# Patient Record
Sex: FEMALE | Race: WHITE | Employment: UNEMPLOYED | URBAN - METROPOLITAN AREA
[De-identification: names, ages, dates, MRNs, and addresses within clinical notes are randomized per-mention and may not be internally consistent; named-entity substitution may affect disease eponyms.]

---

## 2022-02-28 ENCOUNTER — TRANSCRIBE ORDER (OUTPATIENT)
Dept: OCCUPATIONAL MEDICINE | Age: 37
End: 2022-02-28

## 2022-02-28 ENCOUNTER — HOSPITAL ENCOUNTER (OUTPATIENT)
Dept: OCCUPATIONAL MEDICINE | Age: 37
Discharge: HOME | End: 2022-02-28

## 2022-02-28 DIAGNOSIS — T14.90XA INJURY: Primary | ICD-10-CM

## 2022-02-28 DIAGNOSIS — T14.90XA INJURY: ICD-10-CM

## 2022-09-02 ENCOUNTER — HOSPITAL ENCOUNTER (EMERGENCY)
Age: 37
Discharge: HOME | End: 2022-09-02

## 2022-09-02 VITALS
OXYGEN SATURATION: 93 % | TEMPERATURE: 98 F | RESPIRATION RATE: 18 BRPM | HEIGHT: 65 IN | WEIGHT: 270 LBS | BODY MASS INDEX: 44.98 KG/M2 | HEART RATE: 86 BPM | SYSTOLIC BLOOD PRESSURE: 135 MMHG | DIASTOLIC BLOOD PRESSURE: 91 MMHG

## 2022-09-02 DIAGNOSIS — H60.393 INFECTIVE OTITIS EXTERNA OF BOTH EARS: Primary | ICD-10-CM

## 2022-09-02 DIAGNOSIS — H66.003 ACUTE SUPPURATIVE OTITIS MEDIA OF BOTH EARS WITHOUT SPONTANEOUS RUPTURE OF TYMPANIC MEMBRANES, RECURRENCE NOT SPECIFIED: ICD-10-CM

## 2022-09-02 PROCEDURE — 99284 EMERGENCY DEPT VISIT MOD MDM: CPT

## 2022-09-02 PROCEDURE — 6360000002 HC RX W HCPCS

## 2022-09-02 PROCEDURE — 96372 THER/PROPH/DIAG INJ SC/IM: CPT

## 2022-09-02 PROCEDURE — 6370000000 HC RX 637 (ALT 250 FOR IP)

## 2022-09-02 RX ORDER — AMOXICILLIN AND CLAVULANATE POTASSIUM 875; 125 MG/1; MG/1
1 TABLET, FILM COATED ORAL 2 TIMES DAILY
Qty: 20 TABLET | Refills: 0 | Status: SHIPPED | OUTPATIENT
Start: 2022-09-02 | End: 2022-09-12

## 2022-09-02 RX ORDER — ONDANSETRON 4 MG/1
4 TABLET, ORALLY DISINTEGRATING ORAL
Status: COMPLETED | OUTPATIENT
Start: 2022-09-02 | End: 2022-09-02

## 2022-09-02 RX ORDER — HYDROMORPHONE HYDROCHLORIDE 1 MG/ML
1 INJECTION, SOLUTION INTRAMUSCULAR; INTRAVENOUS; SUBCUTANEOUS
Status: COMPLETED | OUTPATIENT
Start: 2022-09-02 | End: 2022-09-02

## 2022-09-02 RX ORDER — HYDROCODONE BITARTRATE AND ACETAMINOPHEN 7.5; 325 MG/1; MG/1
1 TABLET ORAL EVERY 6 HOURS PRN
Qty: 12 TABLET | Refills: 0 | Status: SHIPPED | OUTPATIENT
Start: 2022-09-02 | End: 2022-09-05

## 2022-09-02 RX ORDER — AMOXICILLIN AND CLAVULANATE POTASSIUM 875; 125 MG/1; MG/1
1 TABLET, FILM COATED ORAL
Status: COMPLETED | OUTPATIENT
Start: 2022-09-02 | End: 2022-09-02

## 2022-09-02 RX ORDER — CIPROFLOXACIN AND DEXAMETHASONE 3; 1 MG/ML; MG/ML
4 SUSPENSION/ DROPS AURICULAR (OTIC) 2 TIMES DAILY
Qty: 10 ML | Refills: 0 | Status: SHIPPED | OUTPATIENT
Start: 2022-09-02 | End: 2022-09-09

## 2022-09-02 RX ADMIN — ONDANSETRON 4 MG: 4 TABLET, ORALLY DISINTEGRATING ORAL at 07:25

## 2022-09-02 RX ADMIN — AMOXICILLIN AND CLAVULANATE POTASSIUM 1 TABLET: 875; 125 TABLET, FILM COATED ORAL at 07:29

## 2022-09-02 RX ADMIN — HYDROMORPHONE HYDROCHLORIDE 1 MG: 1 INJECTION, SOLUTION INTRAMUSCULAR; INTRAVENOUS; SUBCUTANEOUS at 07:24

## 2022-09-02 ASSESSMENT — PAIN SCALES - GENERAL
PAINLEVEL_OUTOF10: 9
PAINLEVEL_OUTOF10: 9

## 2022-09-02 ASSESSMENT — ENCOUNTER SYMPTOMS
RESPIRATORY NEGATIVE: 1
GASTROINTESTINAL NEGATIVE: 1
SHORTNESS OF BREATH: 0
EYES NEGATIVE: 1

## 2022-09-02 ASSESSMENT — PAIN DESCRIPTION - LOCATION: LOCATION: EAR

## 2022-09-02 ASSESSMENT — PAIN - FUNCTIONAL ASSESSMENT: PAIN_FUNCTIONAL_ASSESSMENT: 0-10

## 2022-09-02 NOTE — ED PROVIDER NOTES
Otalgia      60-year-old female complaining of bilateral ear pain and yellow drainage from the right ear. Is been going for several days Unbearable this morning when she woke up. The history is provided by the patient. Otalgia  This is a new problem. The current episode started more than 2 days ago. The problem occurs constantly. The problem has been rapidly worsening. Associated symptoms include headaches. Pertinent negatives include no chest pain and no shortness of breath. Nothing aggravates the symptoms. Review of Systems   Constitutional: Negative. Negative for activity change, appetite change, chills, fatigue and fever. HENT:  Positive for ear discharge and ear pain. Eyes: Negative. Respiratory: Negative. Negative for shortness of breath. Cardiovascular: Negative. Negative for chest pain. Gastrointestinal: Negative. Endocrine: Negative. Musculoskeletal: Negative. Neurological:  Positive for headaches. Hematological: Negative. Psychiatric/Behavioral: Negative. All other systems reviewed and are negative. All other systems reviewed and are negative. Past Medical History:   Diagnosis Date    Asthma     albulterol inhaler prn; last episode 3/2014    Chronic kidney disease     x 3    Migraines     at all times since age 16    Morbid obesity ([address]     BMI 44    Other ill-defined conditions(799.89)     migraines    Unspecified sleep apnea     no cpap    Vertigo     r/t migraines        Past Surgical History:   Procedure Laterality Date    HEENT      tonsil        Family History   Problem Relation Age of Onset    Cancer Maternal Grandmother     Cancer Paternal Grandfather     Cancer Paternal Grandmother     Hypertension Mother         Social Connections: Not on file        No Known Allergies     Vitals signs and nursing note reviewed.    Patient Vitals for the past 4 hrs:   Temp Pulse Resp BP SpO2   09/02/22 0702 -- -- -- -- 97 %   09/02/22 0701 -- -- -- (!) 139/104 --   09/02/22 0541 -- -- -- (!) 133/102 --   09/02/22 0539 98 °F (36.7 °C) 85 18 -- 98 %          Physical Exam  Vitals and nursing note reviewed. Constitutional:       Appearance: Normal appearance. She is not ill-appearing. HENT:      Head: Normocephalic and atraumatic. Right Ear: Tenderness present. Left Ear: Tenderness present. Tympanic membrane is erythematous and bulging. Ears:      Comments: Right TM is obscured secondary to canal swelling left TM is red with loss of landmarks. Nose: Nose normal.      Mouth/Throat:      Mouth: Mucous membranes are moist.   Eyes:      Extraocular Movements: Extraocular movements intact. Conjunctiva/sclera: Conjunctivae normal.      Pupils: Pupils are equal, round, and reactive to light. Cardiovascular:      Rate and Rhythm: Normal rate and regular rhythm. Pulses: Normal pulses. Heart sounds: Normal heart sounds. Pulmonary:      Effort: Pulmonary effort is normal. No respiratory distress. Breath sounds: Normal breath sounds. Abdominal:      General: Bowel sounds are normal. There is no distension. Palpations: Abdomen is soft. Musculoskeletal:         General: No swelling or signs of injury. Normal range of motion. Cervical back: Normal range of motion. No rigidity. Skin:     General: Skin is warm and dry. Capillary Refill: Capillary refill takes less than 2 seconds. Neurological:      General: No focal deficit present. Mental Status: She is alert and oriented to person, place, and time. Mental status is at baseline. Psychiatric:         Mood and Affect: Mood normal.         Behavior: Behavior normal.         Thought Content:  Thought content normal.         Judgment: Judgment normal.        Procedures    Labs Reviewed - No data to display     No orders to display              [place] Coma Scale  Eye Opening: Spontaneous  Best Verbal Response: Oriented  Best Motor Response: Obeys commands  Shayne Coma

## 2022-09-02 NOTE — ED NOTES
Report given to Shahana Taylor RN. Transferring patient care at this time.       Sylvia Hagen RN  09/02/22 3549

## 2022-09-02 NOTE — ED TRIAGE NOTES
Pt to ED ambulatory to triage without difficulty with c/o right ear pain. Pt states it began a couple days ago. Pt states yellow/green drainage from the ear as well. Pt denies any fever/chills. Pt states difficulty hearing out of the right ear as well. Pt states taking tylenol otc with no relief.

## 2022-09-02 NOTE — ED NOTES
I have reviewed discharge instructions with the patient. The patient verbalized understanding. Patient left ED via Discharge Method: ambulatory to Home with family. Opportunity for questions and clarification provided. Patient given 3 scripts. To continue your aftercare when you leave the hospital, you may receive an automated call from our care team to check in on how you are doing. This is a free service and part of our promise to provide the best care and service to meet your aftercare needs.  If you have questions, or wish to unsubscribe from this service please call [phone]. Thank you for Choosing our [facility] Emergency Department.         Karyn Lam RN  09/02/22 9957

## 2022-11-26 ENCOUNTER — HOSPITAL ENCOUNTER (EMERGENCY)
Age: 37
Discharge: HOME | End: 2022-11-26
Attending: EMERGENCY MEDICINE

## 2022-11-26 ENCOUNTER — APPOINTMENT (OUTPATIENT)
Dept: GENERAL RADIOLOGY | Age: 37
End: 2022-11-26

## 2022-11-26 VITALS
TEMPERATURE: 98 F | WEIGHT: 275 LBS | RESPIRATION RATE: 15 BRPM | SYSTOLIC BLOOD PRESSURE: 138 MMHG | HEIGHT: 65 IN | OXYGEN SATURATION: 97 % | HEART RATE: 87 BPM | BODY MASS INDEX: 45.82 KG/M2 | DIASTOLIC BLOOD PRESSURE: 94 MMHG

## 2022-11-26 DIAGNOSIS — S93.602A FOOT SPRAIN, LEFT, INITIAL ENCOUNTER: Primary | ICD-10-CM

## 2022-11-26 PROCEDURE — 73630 X-RAY EXAM OF FOOT: CPT

## 2022-11-26 PROCEDURE — 99283 EMERGENCY DEPT VISIT LOW MDM: CPT | Performed by: EMERGENCY MEDICINE

## 2022-11-26 RX ORDER — MELOXICAM 15 MG/1
15 TABLET ORAL DAILY
Qty: 7 TABLET | Refills: 1 | Status: SHIPPED | OUTPATIENT
Start: 2022-11-26 | End: 2022-12-03

## 2022-11-26 ASSESSMENT — PAIN SCALES - GENERAL: PAINLEVEL_OUTOF10: 7

## 2022-11-26 ASSESSMENT — PAIN DESCRIPTION - ORIENTATION: ORIENTATION: LEFT;ANTERIOR

## 2022-11-26 ASSESSMENT — PAIN DESCRIPTION - LOCATION: LOCATION: FOOT

## 2022-11-26 ASSESSMENT — PAIN - FUNCTIONAL ASSESSMENT: PAIN_FUNCTIONAL_ASSESSMENT: 0-10

## 2022-11-26 NOTE — Clinical Note
Neal Ibarra was seen and treated in our emergency department on 11/26/2022. She may return to work on 11/28/2022. If you have any questions or concerns, please don't hesitate to call.       Jie Palmer MD

## 2022-11-26 NOTE — ED NOTES
I have reviewed discharge instructions with the patient. The patient verbalized understanding. Patient left ED via Discharge Method: wheelchair to Home with mother  Opportunity for questions and clarification provided. No esign  Patient given 1 scripts. To continue your aftercare when you leave the hospital, you may receive an automated call from our care team to check in on how you are doing. This is a free service and part of our promise to provide the best care and service to meet your aftercare needs.  If you have questions, or wish to unsubscribe from this service please call [phone]. Thank you for Choosing our [facility] Emergency Department.       Anthnoy Pizarro RN  11/26/22 2970

## 2022-11-26 NOTE — DISCHARGE INSTRUCTIONS
Consider crutches for limited weightbearing for a few days. Consider using boot until rechecked by orthopedist call your orthopedist for recheck. Alternative orthopedic number given as well.

## 2022-11-26 NOTE — ED NOTES
Crutches and boot given to patient. Pt understands how to use crutches. Boot placed on patient.       Minesh Hussein RN  11/26/22 0808

## 2022-11-26 NOTE — Clinical Note
Seda Armenta was seen and treated in our emergency department on 11/26/2022. She may return to work on 11/28/2022. If you have any questions or concerns, please don't hesitate to call.       Gloria Oshea MD

## 2022-11-26 NOTE — ED TRIAGE NOTES
Pt arrives via ambulatory c/o left top of foot pain after hearing a pop. Pt states pain worsening with time up the leg.

## 2022-11-26 NOTE — ED PROVIDER NOTES
Emergency Department Provider Note                   PCP:                Md Corby Palmer               Age: 40 y.o. Sex: female     No diagnosis found. DISPOSITION          MDM  Number of Diagnoses or Management Options  Diagnosis management comments: Foot pain and injury. Imaging to assess for fracture. Amount and/or Complexity of Data Reviewed  Tests in the radiology section of CPT®: reviewed and ordered    Risk of Complications, Morbidity, and/or Mortality  Presenting problems: low  Diagnostic procedures: minimal  Management options: low    Patient Progress  Patient progress: stable             Orders Placed This Encounter   Procedures    XR FOOT LEFT (MIN 3 VIEWS)        Medications - No data to display    New Prescriptions    No medications on file        Zen Bolden is a 40 y.o. female who presents to the Emergency Department with chief complaint of    Chief Complaint   Patient presents with    Foot Pain      52-year-old female awoke with some left foot pain on the dorsum about 2 weeks ago. Has some increasing pain and discomfort when she walks. 2 days ago she felt a pop when climbing steps and the top of her foot and the pain is severely worsen. Much pain with ambulation. No numbness or weakness. No fever chills. Has not noticed any discoloration or swelling. Pain radiates up into the anterior shin region. No history of DVT or PE. The history is provided by the patient. Review of Systems   Constitutional:  Negative for chills and fever. Skin:  Negative for rash and wound. Neurological:  Negative for weakness and numbness.      Past Medical History:   Diagnosis Date    Asthma     albulterol inhaler prn; last episode 3/2014    Chronic kidney disease     x 3    Migraines     at all times since age 16    Morbid obesity ([facility] [address]     BMI 44    Other ill-defined conditions(799.89)     migraines    Unspecified sleep apnea     no cpap    Vertigo     r/t migraines        Past Surgical History:   Procedure Laterality Date    HEENT      tonsil        Family History   Problem Relation Age of Onset    Cancer Maternal Grandmother     Cancer Paternal Grandfather     Cancer Paternal Grandmother     Hypertension Mother         Social History     Socioeconomic History    Marital status:    Tobacco Use    Smoking status: Never   Substance and Sexual Activity    Alcohol use: Yes    Drug use: No         Patient has no known allergies. Previous Medications    ACETAMINOPHEN (TYLENOL) 325 MG TABLET    Take 325 mg by mouth every 4 hours as needed    ALBUTEROL SULFATE  (90 BASE) MCG/ACT INHALER    Inhale into the lungs every 4 hours as needed    ONDANSETRON (ZOFRAN-ODT) 8 MG TBDP DISINTEGRATING TABLET    Take 8 mg by mouth every 8 hours as needed    OXYCODONE-ACETAMINOPHEN (PERCOCET) 5-325 MG PER TABLET    Take 1 tablet by mouth every 4 hours as needed. TAMSULOSIN (FLOMAX) 0.4 MG CAPSULE    Take 0.4 mg by mouth daily        Vitals signs and nursing note reviewed. Patient Vitals for the past 4 hrs:   Temp Pulse Resp BP SpO2   11/26/22 1057 98 °F (36.7 °C) 87 15 (!) 138/94 97 %          Physical Exam  Vitals and nursing note reviewed. Constitutional:       Appearance: She is not ill-appearing. Musculoskeletal:      Comments: Left knee without tenderness or swelling with full range of motion. No calf tenderness. No tenderness either anterior or posterior tibial region. Malleolar IV ankle nontender. Rather marked tenderness on the dorsum of the foot. Minimal soft tissue swelling. Pain with flexion extension. Also pain with flexion of toes. Dorsalis pedis 1+. Skin:     General: Skin is warm and dry. Procedures    No results found for any visits on 11/26/22. XR FOOT LEFT (MIN 3 VIEWS)    (Results Pending)          Results Include:    No results found for this or any previous visit (from the past 24 hour(s)). Anti-inflammatory. Assist with weightbearing. Patient is to see orthopedics for follow-up. Voice dictation software was used during the making of this note. This software is not perfect and grammatical and other typographical errors may be present. This note has not been completely proofread for errors.      Beckie Best MD  11/26/22 1111       Beckie Best MD  11/26/22 0063

## 2023-01-09 ENCOUNTER — HOSPITAL ENCOUNTER (EMERGENCY)
Dept: GENERAL RADIOLOGY | Age: 38
Discharge: HOME | End: 2023-01-12

## 2023-01-09 ENCOUNTER — HOSPITAL ENCOUNTER (EMERGENCY)
Age: 38
Discharge: HOME | End: 2023-01-09
Attending: EMERGENCY MEDICINE

## 2023-01-09 VITALS
RESPIRATION RATE: 16 BRPM | OXYGEN SATURATION: 99 % | BODY MASS INDEX: 41.99 KG/M2 | HEART RATE: 91 BPM | WEIGHT: 252 LBS | DIASTOLIC BLOOD PRESSURE: 99 MMHG | TEMPERATURE: 98.2 F | HEIGHT: 65 IN | SYSTOLIC BLOOD PRESSURE: 136 MMHG

## 2023-01-09 DIAGNOSIS — R20.2 PARESTHESIA: Primary | ICD-10-CM

## 2023-01-09 LAB
ALBUMIN SERPL-MCNC: 3.9 G/DL (ref 3.5–5)
ALBUMIN/GLOB SERPL: 1 (ref 0.4–1.6)
ALP SERPL-CCNC: 81 U/L (ref 50–136)
ALT SERPL-CCNC: 20 U/L (ref 12–65)
ANION GAP SERPL CALC-SCNC: 4 MMOL/L (ref 2–11)
AST SERPL-CCNC: 12 U/L (ref 15–37)
BASOPHILS # BLD: 0.1 K/UL (ref 0–0.2)
BASOPHILS NFR BLD: 1 % (ref 0–2)
BILIRUB SERPL-MCNC: 0.3 MG/DL (ref 0.2–1.1)
BUN SERPL-MCNC: 17 MG/DL (ref 6–23)
CALCIUM SERPL-MCNC: 9.1 MG/DL (ref 8.3–10.4)
CHLORIDE SERPL-SCNC: 106 MMOL/L (ref 101–110)
CO2 SERPL-SCNC: 27 MMOL/L (ref 21–32)
CREAT SERPL-MCNC: 0.9 MG/DL (ref 0.6–1)
DIFFERENTIAL METHOD BLD: ABNORMAL
EOSINOPHIL # BLD: 0.1 K/UL (ref 0–0.8)
EOSINOPHIL NFR BLD: 1 % (ref 0.5–7.8)
ERYTHROCYTE [DISTWIDTH] IN BLOOD BY AUTOMATED COUNT: 13.3 % (ref 11.9–14.6)
GLOBULIN SER CALC-MCNC: 4.1 G/DL (ref 2.8–4.5)
GLUCOSE SERPL-MCNC: 99 MG/DL (ref 65–100)
HCT VFR BLD AUTO: 40.9 % (ref 35.8–46.3)
HGB BLD-MCNC: 12.8 G/DL (ref 11.7–15.4)
IMM GRANULOCYTES # BLD AUTO: 0 K/UL (ref 0–0.5)
IMM GRANULOCYTES NFR BLD AUTO: 0 % (ref 0–5)
LYMPHOCYTES # BLD: 2.8 K/UL (ref 0.5–4.6)
LYMPHOCYTES NFR BLD: 26 % (ref 13–44)
MAGNESIUM SERPL-MCNC: 2.2 MG/DL (ref 1.8–2.4)
MCH RBC QN AUTO: 28.6 PG (ref 26.1–32.9)
MCHC RBC AUTO-ENTMCNC: 31.3 G/DL (ref 31.4–35)
MCV RBC AUTO: 91.5 FL (ref 82–102)
MONOCYTES # BLD: 0.6 K/UL (ref 0.1–1.3)
MONOCYTES NFR BLD: 6 % (ref 4–12)
NEUTS SEG # BLD: 7.1 K/UL (ref 1.7–8.2)
NEUTS SEG NFR BLD: 66 % (ref 43–78)
NRBC # BLD: 0 K/UL (ref 0–0.2)
PLATELET # BLD AUTO: 421 K/UL (ref 150–450)
PMV BLD AUTO: 9.2 FL (ref 9.4–12.3)
POTASSIUM SERPL-SCNC: 4.2 MMOL/L (ref 3.5–5.1)
PROT SERPL-MCNC: 8 G/DL (ref 6.3–8.2)
RBC # BLD AUTO: 4.47 M/UL (ref 4.05–5.2)
SODIUM SERPL-SCNC: 137 MMOL/L (ref 133–143)
WBC # BLD AUTO: 10.8 K/UL (ref 4.3–11.1)

## 2023-01-09 PROCEDURE — 99284 EMERGENCY DEPT VISIT MOD MDM: CPT

## 2023-01-09 PROCEDURE — 85025 COMPLETE CBC W/AUTO DIFF WBC: CPT

## 2023-01-09 PROCEDURE — 80053 COMPREHEN METABOLIC PANEL: CPT

## 2023-01-09 PROCEDURE — 83735 ASSAY OF MAGNESIUM: CPT

## 2023-01-09 PROCEDURE — 72100 X-RAY EXAM L-S SPINE 2/3 VWS: CPT

## 2023-01-09 RX ORDER — METHYLPREDNISOLONE 4 MG/1
TABLET ORAL
Qty: 1 KIT | Refills: 0 | Status: SHIPPED | OUTPATIENT
Start: 2023-01-09

## 2023-01-09 RX ORDER — NAPROXEN 500 MG/1
500 TABLET ORAL 2 TIMES DAILY WITH MEALS
Qty: 28 TABLET | Refills: 0 | Status: SHIPPED | OUTPATIENT
Start: 2023-01-09 | End: 2023-01-23

## 2023-01-09 ASSESSMENT — ENCOUNTER SYMPTOMS
SINUS PAIN: 0
TROUBLE SWALLOWING: 0
EYE PAIN: 0
COUGH: 0
SHORTNESS OF BREATH: 0
DIARRHEA: 0
WHEEZING: 0
ABDOMINAL PAIN: 0
VOMITING: 0
EYE REDNESS: 0
NAUSEA: 0
CONSTIPATION: 0
EYE ITCHING: 0
BACK PAIN: 0

## 2023-01-09 ASSESSMENT — PAIN - FUNCTIONAL ASSESSMENT: PAIN_FUNCTIONAL_ASSESSMENT: 0-10

## 2023-01-09 ASSESSMENT — PAIN SCALES - GENERAL: PAINLEVEL_OUTOF10: 8

## 2023-01-09 ASSESSMENT — PAIN DESCRIPTION - ORIENTATION: ORIENTATION: LEFT

## 2023-01-09 ASSESSMENT — PAIN DESCRIPTION - LOCATION: LOCATION: FOOT

## 2023-01-09 NOTE — DISCHARGE INSTRUCTIONS
Take medications as directed    Follow-up with orthopedics for your ongoing foot pain    We would love to help you get a primary care doctor for follow-up after your emergency department visit. Please call [phone] between 7AM - 6PM Monday to Friday. A care navigator will be able to assist you with setting up a doctor close to your home.        Return to ER for any worsening symptoms or new problems which may arise

## 2023-01-09 NOTE — ED TRIAGE NOTES
Patient ambulatory to triage with crutches with CO numbness in LLE. Walking boot to leg r/t injury in November.  Pain in foot worse today

## 2023-01-09 NOTE — ED NOTES
I have reviewed discharge instructions with the patient. The patient verbalized understanding. Patient left ED via Discharge Method: ambulatory to Home with self. Opportunity for questions and clarification provided. Patient given 2 scripts. To continue your aftercare when you leave the hospital, you may receive an automated call from our care team to check in on how you are doing. This is a free service and part of our promise to provide the best care and service to meet your aftercare needs.  If you have questions, or wish to unsubscribe from this service please call [phone]. Thank you for Choosing our [address] [place] [address] Emergency Department.         Roberto Cartwright RN  01/09/23 0263

## 2023-01-09 NOTE — ED PROVIDER NOTES
Emergency Department Provider Note                   PCP:                On File Not (Inactive)               Age: 40 y.o. Sex: female       ICD-10-CM    1. Paresthesia  R20.2           DISPOSITION Decision To Discharge 01/09/2023 04:48:37 PM       Medical Decision Making  60-year-old female patient here with complaints of diffuse numbness in her left leg onset earlier this afternoon  Improving currently  History of a foot injury about 2 months ago continues to wear walking boot. Due to insurance reasons she has not yet been followed up with an orthopedic specialist    Will check basic labs and lumbar spine films    4:50 PM  I reviewed the imaging  Minimal degenerative changes noted  Lab work is normal per my review    Patient will be discharged with course of steroids and naproxen    Advised to follow-up with orthopedics for her ongoing foot problem. Advised primary care follow-up    Amount and/or Complexity of Data Reviewed  Labs: ordered. Radiology: ordered. Risk  Prescription drug management. Risk Details: Elements of this note have been dictated via voice recognition software. Text and phrases may be limited by the accuracy of the software. The chart has been reviewed, but errors may still be present. Complexity of Problem: 1 stable, chronic illness. (3)  Complexity of Problem: 1 acute, uncomplicated illness or injury. (3)    I have conducted an independent ordering and review of Labs. I have conducted an independent ordering and review of X-rays. I have reviewed records from an external source: ED records from outside this hospital.    Social determinant affecting care: Patient uninsured until recently. States she does have insurance coverage currently and is working to get established with primary care as well as an orthopedic follow-up      Patient was discharged risks and benefits of hospitalization were discussed.          Orders Placed This Encounter   Procedures    XR LUMBAR SPINE (2-3 VIEWS)    CBC with Auto Differential    Comprehensive Metabolic Panel    Magnesium        Medications - No data to display    Discharge Medication List as of 1/9/2023  5:08 PM        START taking these medications    Details   methylPREDNISolone (MEDROL DOSEPACK) 4 MG tablet Take by mouth as directed on pack, Disp-1 kit, R-0Print      naproxen (NAPROSYN) 500 MG tablet Take 1 tablet by mouth 2 times daily (with meals) for 14 days, Disp-28 tablet, R-0Print              Kristopher Bhatti is a 40 y.o. female who presents to the Emergency Department with chief complaint of    Chief Complaint   Patient presents with    Numbness      45-year-old female patient presents ER with complaints of numbness diffusely through her left leg  Occurred while she was sitting at work today  No other numbness or weakness in any of the other extremities  Denies prior episodes  Has had pain in her left foot area related to a sprain injury in late November  No recent trauma    Currently states that the numb tingling sensation has mostly resolved    The history is provided by the patient. Numbness  This is a new problem. The current episode started 1 to 2 hours ago. The problem occurs constantly. The problem has been gradually improving. Pertinent negatives include no chest pain, no abdominal pain, no headaches and no shortness of breath. Nothing aggravates the symptoms. Nothing relieves the symptoms. She has tried nothing for the symptoms. Review of Systems   Constitutional:  Negative for chills, fatigue and fever. HENT:  Negative for ear pain, hearing loss, sinus pain, sneezing and trouble swallowing. Eyes:  Negative for pain, redness and itching. Respiratory:  Negative for cough, shortness of breath and wheezing. Cardiovascular:  Negative for chest pain and palpitations. Gastrointestinal:  Negative for abdominal pain, constipation, diarrhea, nausea and vomiting.    Endocrine: Negative for polydipsia, polyphagia and polyuria. Genitourinary:  Negative for dysuria, flank pain, frequency and hematuria. Musculoskeletal:  Negative for arthralgias, back pain and myalgias. Persistent foot pain x2 months   Skin:  Negative for rash and wound. Allergic/Immunologic: Negative for food allergies and immunocompromised state. Neurological:  Positive for numbness. Negative for dizziness, syncope, speech difficulty, light-headedness and headaches. Hematological:  Negative for adenopathy. Does not bruise/bleed easily. Psychiatric/Behavioral:  Negative for dysphoric mood and self-injury. The patient is not nervous/anxious. All other systems reviewed and are negative. Past Medical History:   Diagnosis Date    Asthma     albulterol inhaler prn; last episode 3/2014    Chronic kidney disease     x 3    Migraines     at all times since age 16    Morbid obesity ([facility] [address]     BMI 44    Other ill-defined conditions(799.89)     migraines    Unspecified sleep apnea     no cpap    Vertigo     r/t migraines        Past Surgical History:   Procedure Laterality Date    HEENT      tonsil        Family History   Problem Relation Age of Onset    Cancer Maternal Grandmother     Cancer Paternal Grandfather     Cancer Paternal Grandmother     Hypertension Mother         Social History     Socioeconomic History    Marital status:    Tobacco Use    Smoking status: Never   Substance and Sexual Activity    Alcohol use: Yes    Drug use: No        Allergies: Patient has no known allergies.     Discharge Medication List as of 1/9/2023  5:08 PM        CONTINUE these medications which have NOT CHANGED    Details   meloxicam (MOBIC) 15 MG tablet Take 1 tablet by mouth daily for 7 days, Disp-7 tablet, R-1Print      acetaminophen (TYLENOL) 325 MG tablet Take 325 mg by mouth every 4 hours as neededHistorical Med      albuterol sulfate  (90 Base) MCG/ACT inhaler Inhale into the lungs every 4 hours as neededHistorical Med      ondansetron (ZOFRAN-ODT) 8 MG TBDP disintegrating tablet Take 8 mg by mouth every 8 hours as neededHistorical Med      oxyCODONE-acetaminophen (PERCOCET) 5-325 MG per tablet Take 1 tablet by mouth every 4 hours as needed. Historical Med      tamsulosin (FLOMAX) 0.4 MG capsule Take 0.4 mg by mouth dailyHistorical Med              Vitals signs and nursing note reviewed. No data found. Physical Exam  Vitals and nursing note reviewed. Constitutional:       General: She is in acute distress. Appearance: Normal appearance. She is obese. HENT:      Head: Normocephalic and atraumatic. Right Ear: External ear normal.      Left Ear: External ear normal.      Nose: Nose normal.      Mouth/Throat:      Mouth: Mucous membranes are moist.      Pharynx: Oropharynx is clear. Eyes:      General: No scleral icterus. Extraocular Movements: Extraocular movements intact. Conjunctiva/sclera: Conjunctivae normal.      Pupils: Pupils are equal, round, and reactive to light. Cardiovascular:      Rate and Rhythm: Normal rate and regular rhythm. Pulses: Normal pulses. Heart sounds: Normal heart sounds. Pulmonary:      Effort: Pulmonary effort is normal. No respiratory distress. Breath sounds: Normal breath sounds. Abdominal:      General: Abdomen is flat. Bowel sounds are normal. There is no distension. Palpations: Abdomen is soft. There is no mass. Tenderness: There is no abdominal tenderness. Musculoskeletal:         General: No deformity or signs of injury. Normal range of motion. Cervical back: Normal range of motion and neck supple. Comments: Patient in a left walking boot  Distal pulses at DP and PT are strong  No edema in the leg there is no erythema  Patient states that her sensation is currently intact   Skin:     General: Skin is warm and dry. Capillary Refill: Capillary refill takes less than 2 seconds. Neurological:      General: No focal deficit present. Mental Status: She is alert and oriented to person, place, and time. Psychiatric:         Mood and Affect: Mood normal.         Behavior: Behavior normal.         Thought Content: Thought content normal.         Judgment: Judgment normal.        Procedures    ED EKG Interpretation  EKG was interpreted in the absence of a cardiologist.        Results for orders placed or performed during the hospital encounter of 01/09/23   XR LUMBAR SPINE (2-3 VIEWS)    Narrative    EXAMINATION: XR LUMBAR SPINE (2-3 VIEWS) 1/9/2023 2:23 PM    ACCESSION NUMBER: [id number]    COMPARISON: Thoracic spine radiograph February 28, 2022    INDICATION: Back pain    TECHNIQUE: 3 views of the lumbar spine were obtained. FINDINGS:   There are 5 lumbar-type vertebral bodies. Normal mineralization is maintained. No acute fracture is identified. The vertebral body heights are maintained. Normal lumbar lordosis. Mild grade 1 retrolisthesis of L5 on S1 . Mild  Degenerative changes with associated disc space loss, osteophyte formation, and  facet hypertrophy. The prevertebral soft tissues are within normal limits. The  sacrum is obscured by stool and overlying bowel gas. The sacroiliac joints are  unremarkable. Impression    1. No acute abnormality. 2. Mild multilevel degenerative changes most pronounced at L5-S1 with associated  mild grade 1 retrolisthesis.      CBC with Auto Differential   Result Value Ref Range    WBC 10.8 4.3 - 11.1 K/uL    RBC 4.47 4.05 - 5.2 M/uL    Hemoglobin 12.8 11.7 - 15.4 g/dL    Hematocrit 40.9 35.8 - 46.3 %    MCV 91.5 82 - 102 FL    MCH 28.6 26.1 - 32.9 PG    MCHC 31.3 (L) 31.4 - 35.0 g/dL    RDW 13.3 11.9 - 14.6 %    Platelets 653 860 - 880 K/uL    MPV 9.2 (L) 9.4 - 12.3 FL    nRBC 0.00 0.0 - 0.2 K/uL    Differential Type AUTOMATED      Seg Neutrophils 66 43 - 78 %    Lymphocytes 26 13 - 44 %    Monocytes 6 4.0 - 12.0 %    Eosinophils % 1 0.5 - 7.8 %    Basophils 1 0.0 - 2.0 %    Immature Granulocytes 0 0.0 - 5.0 %    Segs Absolute 7.1 1.7 - 8.2 K/UL    Absolute Lymph # 2.8 0.5 - 4.6 K/UL    Absolute Mono # 0.6 0.1 - 1.3 K/UL    Absolute Eos # 0.1 0.0 - 0.8 K/UL    Basophils Absolute 0.1 0.0 - 0.2 K/UL    Absolute Immature Granulocyte 0.0 0.0 - 0.5 K/UL   Comprehensive Metabolic Panel   Result Value Ref Range    Sodium 137 133 - 143 mmol/L    Potassium 4.2 3.5 - 5.1 mmol/L    Chloride 106 101 - 110 mmol/L    CO2 27 21 - 32 mmol/L    Anion Gap 4 2 - 11 mmol/L    Glucose 99 65 - 100 mg/dL    BUN 17 6 - 23 MG/DL    Creatinine 0.90 0.6 - 1.0 MG/DL    Est, Glom Filt Rate >60 >60 ml/min/1.73m2    Calcium 9.1 8.3 - 10.4 MG/DL    Total Bilirubin 0.3 0.2 - 1.1 MG/DL    ALT 20 12 - 65 U/L    AST 12 (L) 15 - 37 U/L    Alk Phosphatase 81 50 - 136 U/L    Total Protein 8.0 6.3 - 8.2 g/dL    Albumin 3.9 3.5 - 5.0 g/dL    Globulin 4.1 2.8 - 4.5 g/dL    Albumin/Globulin Ratio 1.0 0.4 - 1.6     Magnesium   Result Value Ref Range    Magnesium 2.2 1.8 - 2.4 mg/dL        XR LUMBAR SPINE (2-3 VIEWS)   Final Result   1. No acute abnormality. 2. Mild multilevel degenerative changes most pronounced at L5-S1 with associated   mild grade 1 retrolisthesis. Voice dictation software was used during the making of this note. This software is not perfect and grammatical and other typographical errors may be present. This note has not been completely proofread for errors.      Lana Councilman, MD  01/09/23 7224

## 2023-03-01 ENCOUNTER — OFFICE VISIT (OUTPATIENT)
Dept: ORTHOPEDIC SURGERY | Age: 38
End: 2023-03-01
Payer: COMMERCIAL

## 2023-03-01 DIAGNOSIS — M76.822 TIBIALIS TENDINITIS OF LEFT LOWER EXTREMITY: Primary | ICD-10-CM

## 2023-03-01 PROCEDURE — 99214 OFFICE O/P EST MOD 30 MIN: CPT | Performed by: ORTHOPAEDIC SURGERY

## 2023-03-01 RX ORDER — MELOXICAM 15 MG/1
15 TABLET ORAL DAILY
Qty: 30 TABLET | Refills: 3 | Status: SHIPPED | OUTPATIENT
Start: 2023-03-01

## 2023-03-01 NOTE — PROGRESS NOTES
Name: Mel Hooper  YOB: 1985  Gender: female  MRN: [id number]    Summary:   Left painful accessory navicular planovalgus hindfoot and sinus Tarsi pain       CC: New Patient (Left foot  Patient seen at [facility] on 11-26-22 x-rays were taken)       HPI: Mel Hooper is a 41 y.o. female who presents with New Patient (Left foot  Patient seen at [facility] on 11-26-22 x-rays were taken)  . This patient presents the office today with a several month history of worsening pain located in her left foot after an injury back in November. She is seen multiple times in the emergency room for this. I reviewed the medical records from this. She had x-rays performed. She since has been living in a walker boot since last September and complains of pain located somewhat medially but more laterally in her ankle area. She states she was told by the emergency room she may have a ligament damage into her ankle and foot. History was obtained by Patient     ROS/Meds/PSH/PMH/FH/SH: I personally reviewed the patients standard intake form. Below are the pertinents    Tobacco:  reports that she has never smoked. She does not have any smokeless tobacco history on file. Diabetes: None      Physical Examination:    Exam of the left foot and ankle shows mild tenderness to palpation over the Lisfranc area but good stability. She does have some tenderness to palpation medially of the posterior tibial tendon and accessory navicular. There is also significant pain located in her sinus Tarsi as well. She has good subtalar joint range of motion. She has palpable pulses.     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 qday x 14 days: An Rx was given. We discussed the use of Mobic. I advised not to combine it with other NSAIDS such as advil, motrin, nor aleve. I discussed Mobic and its affect on the GI system, its risk of ulcer formation/exacerbation. I also discussed its affects on the kidneys and risk of nephritis and kidney damage. We discussed how it can alter your blood coagulability and limit platelet function, its negative affect on coronary artery disease, and how excessive alcohol use with Mobic can make all these problems worse. She is failed several months of conservative treatment at this point. I discussed an MRI of the left ankle which I recommended at this time to evaluate the posterior tibial tendon as well as for ligament damage in the ankle. We discussed a potential flatfoot reconstruction with painful accessory navicular incision in the future if needed.

## 2023-03-11 ENCOUNTER — HOSPITAL ENCOUNTER (EMERGENCY)
Age: 38
Discharge: HOME | End: 2023-03-11
Attending: EMERGENCY MEDICINE
Payer: COMMERCIAL

## 2023-03-11 ENCOUNTER — APPOINTMENT (OUTPATIENT)
Dept: GENERAL RADIOLOGY | Age: 38
End: 2023-03-11
Payer: COMMERCIAL

## 2023-03-11 VITALS
SYSTOLIC BLOOD PRESSURE: 113 MMHG | RESPIRATION RATE: 22 BRPM | TEMPERATURE: 97.8 F | DIASTOLIC BLOOD PRESSURE: 79 MMHG | HEART RATE: 87 BPM | OXYGEN SATURATION: 99 % | HEIGHT: 65 IN | BODY MASS INDEX: 42.32 KG/M2 | WEIGHT: 254 LBS

## 2023-03-11 DIAGNOSIS — M94.0 COSTOCHONDRITIS: Primary | ICD-10-CM

## 2023-03-11 PROCEDURE — 99283 EMERGENCY DEPT VISIT LOW MDM: CPT

## 2023-03-11 PROCEDURE — 71046 X-RAY EXAM CHEST 2 VIEWS: CPT

## 2023-03-11 ASSESSMENT — PAIN DESCRIPTION - FREQUENCY: FREQUENCY: INTERMITTENT

## 2023-03-11 ASSESSMENT — PAIN DESCRIPTION - PAIN TYPE: TYPE: ACUTE PAIN

## 2023-03-11 ASSESSMENT — PAIN DESCRIPTION - ORIENTATION: ORIENTATION: RIGHT;LEFT

## 2023-03-11 ASSESSMENT — PAIN DESCRIPTION - DESCRIPTORS: DESCRIPTORS: SQUEEZING;STABBING

## 2023-03-11 ASSESSMENT — PAIN DESCRIPTION - LOCATION: LOCATION: RIB CAGE

## 2023-03-11 ASSESSMENT — PAIN SCALES - GENERAL: PAINLEVEL_OUTOF10: 7

## 2023-03-11 ASSESSMENT — PAIN - FUNCTIONAL ASSESSMENT: PAIN_FUNCTIONAL_ASSESSMENT: 0-10

## 2023-03-11 NOTE — DISCHARGE INSTRUCTIONS
Continue any at home antibiotics or prednisone. Add Tylenol Motrin for your discomfort. Your symptoms should resolve over the next 2 to 3 days.   Return to ER if any worsening symptoms

## 2023-03-11 NOTE — Clinical Note
Boby Seaman was seen and treated in our emergency department on 3/11/2023. She may return to work on 03/13/2023. If you have any questions or concerns, please don't hesitate to call.       MARLEY Pendleton

## 2023-03-11 NOTE — ED PROVIDER NOTES
Emergency Department Provider Note                   PCP:                On File Not (Inactive)               Age: 41 y.o. Sex: female     DISPOSITION Decision To Discharge 03/11/2023 06:21:40 PM       ICD-10-CM    1. Costochondritis  M94.0           MEDICAL DECISION MAKING  Complexity of Problems Addressed:  1 simple acute illness    Data Reviewed and Analyzed:  Category 1:   I reviewed records from an external source: ED records from outside this hospital.  I ordered each unique test.  I reviewed the results of each unique test.        Category 2:   I independently ordered and reviewed the X-rays. Chest X-ray    Category 3: Discussion of management or test interpretation. Patient to ER complaining of shortness of breath today after starting back to work after recent upper respiratory infection. Patient is PERC negative. Chest x-ray today is negative for any abnormalities. O2 sats are 99% on room air she is not tachypneic she is afebrile blood pressure is normal.  With normal chest x-ray and recent symptoms do not feel further studies needed at this time risk for PE is minimal.  Patient given reassurance told to continue to finish any current at home medicines see primary care for routine recheck return to ER for any worsening symptoms. This could be mild costochondritis but patient has been treated with prednisone we will stressed over-the-counter anti-inflammatories       Risk of Complications and/or Morbidity of Patient Management:  OTC drug management performed     Carrillo Cabezas is a 41 y.o. female who presents to the Emergency Department with chief complaint of    Chief Complaint   Patient presents with    Shortness of Breath      Patient to ER complaining of bilateral lower rib area pain worse when she takes deep breaths this started today. Patient started having upper respiratory symptoms last Sunday. She was seen at urgent care had negative flu and COVID and strep test at that visit.   She was placed on some prednisone at that time for possible viral infection. Patient was then seen at a second urgent care this past Wednesday antibiotics were added at this time due to more cough and upper respiratory symptoms. Patient states she was doing better until she went to work today and started having some increased lower rib pain and feeling short of breath. Patient does not smoke she is not on any hormone therapy she has not been on any long trips she denies any recent trauma patient is in a cam walker due to right foot problems but she has been fully weightbearing in this walker boot for about 2 months. She denies any calf pain or swelling    Past Medical History:  No date: Asthma      Comment:  albulterol inhaler prn; last episode 3/2014  No date: Chronic kidney disease      Comment:  x 3  No date: Migraines      Comment:  at all times since age 16  No date: Morbid obesity ([facility] [address]      Comment:  BMI 44  No date: Other ill-defined conditions(799.89)      Comment:  migraines  No date: Unspecified sleep apnea      Comment:  no cpap  No date: Vertigo      Comment:  r/t migraines     Past Surgical History:  No date: HEENT      Comment:  tonsil          Review of Systems   All other systems reviewed and are negative. Vitals signs and nursing note reviewed. Patient Vitals for the past 4 hrs:   Temp Pulse Resp BP SpO2   03/11/23 1627 97.8 °F (36.6 °C) 87 22 113/79 99 %          Physical Exam  Vitals and nursing note reviewed. Constitutional:       Appearance: Normal appearance. She is normal weight. HENT:      Head: Normocephalic and atraumatic. Right Ear: External ear normal.      Left Ear: External ear normal.      Nose: Nose normal.      Mouth/Throat:      Mouth: Mucous membranes are moist.      Pharynx: Oropharynx is clear. Eyes:      Extraocular Movements: Extraocular movements intact. Pupils: Pupils are equal, round, and reactive to light.    Cardiovascular:      Rate and Rhythm: Normal rate and regular rhythm. Pulses: Normal pulses. Heart sounds: Normal heart sounds. Pulmonary:      Effort: Pulmonary effort is normal. No tachypnea. Breath sounds: Normal breath sounds. No stridor. No decreased breath sounds, wheezing, rhonchi or rales. Chest:      Chest wall: Tenderness present. Comments: Patient has bilateral lower rib cage area pain to compression. Lungs are clear no wheezes pain is worse with deep inspiration. There is no skin changes noted  Abdominal:      Tenderness: There is no abdominal tenderness. Hernia: No hernia is present. Musculoskeletal:         General: Normal range of motion. Cervical back: Normal range of motion and neck supple. Skin:     General: Skin is warm and dry. Neurological:      General: No focal deficit present. Mental Status: She is alert.    Psychiatric:         Mood and Affect: Mood normal.         Behavior: Behavior normal.        Procedures     Orders Placed This Encounter   Procedures    XR CHEST (2 VW)        Medications - No data to display    New Prescriptions    No medications on file        Past Medical History:   Diagnosis Date    Asthma     albulterol inhaler prn; last episode 3/2014    Chronic kidney disease     x 3    Migraines     at all times since age 16    Morbid obesity ([address]     BMI 44    Other ill-defined conditions(799.89)     migraines    Unspecified sleep apnea     no cpap    Vertigo     r/t migraines        Past Surgical History:   Procedure Laterality Date    HEENT      tonsil        Family History   Problem Relation Age of Onset    Cancer Maternal Grandmother     Cancer Paternal Grandfather     Cancer Paternal Grandmother     Hypertension Mother         Social History     Socioeconomic History    Marital status:      Spouse name: None    Number of children: None    Years of education: None    Highest education level: None   Tobacco Use    Smoking status: Never   Substance and Sexual Activity Alcohol use: Yes    Drug use: No        Allergies: Patient has no known allergies. Previous Medications    ACETAMINOPHEN (TYLENOL) 325 MG TABLET    Take 325 mg by mouth every 4 hours as needed    ALBUTEROL SULFATE  (90 BASE) MCG/ACT INHALER    Inhale into the lungs every 4 hours as needed    MELOXICAM (MOBIC) 15 MG TABLET    Take 1 tablet by mouth daily for 7 days    MELOXICAM (MOBIC) 15 MG TABLET    Take 1 tablet by mouth daily    NAPROXEN (NAPROSYN) 500 MG TABLET    Take 1 tablet by mouth 2 times daily (with meals) for 14 days    ONDANSETRON (ZOFRAN-ODT) 8 MG TBDP DISINTEGRATING TABLET    Take 8 mg by mouth every 8 hours as needed    OXYCODONE-ACETAMINOPHEN (PERCOCET) 5-325 MG PER TABLET    Take 1 tablet by mouth every 4 hours as needed. TAMSULOSIN (FLOMAX) 0.4 MG CAPSULE    Take 0.4 mg by mouth daily        Results for orders placed or performed during the hospital encounter of 03/11/23   XR CHEST (2 VW)    Narrative    XR CHEST (2 VW), 3/11/2023 5:00 PM   INDICATION: Shortness of breath  COMPARISON: None    FINDINGS:     Cardiovascular/Mediastinum: Normal cardiomediastinal silhouette. Lungs/pleura: No focal airspace opacity. No pleural effusion. No pneumothorax. Upper abdomen: Visualized portions are unremarkable. Osseous/soft tissue structures: No acute osseous abnormality. Impression    No radiographic evidence of acute cardiopulmonary abnormality. Thank you for the referral of this patient. This exam was interpreted by an   American Board of Radiology certified radiologist with subspecialty fellowship   in [place] [id number]. If there are any questions regarding this exam please feel   free to contact us directly at [phone]. Kaya Fox M.D.   3/11/2023 5:38:00 PM        XR CHEST (2 VW)   Final Result   No radiographic evidence of acute cardiopulmonary abnormality. Thank you for the referral of this patient.  This exam was interpreted by an    American Board of Radiology certified radiologist with subspecialty fellowship    in [place] [id number]. If there are any questions regarding this exam please feel    free to contact us directly at [phone]. Candelario Wayne M.D.    3/11/2023 5:38:00 PM                        Voice dictation software was used during the making of this note. This software is not perfect and grammatical and other typographical errors may be present. This note has not been completely proofread for errors.       MARLEY Gary  03/11/23 MARLEY Clements  03/11/23 Andreina Hammond

## 2023-03-11 NOTE — ED NOTES
I have reviewed discharge instructions with the patient. The patient verbalized understanding. Patient left ED via Discharge Method: ambulatory to Home with family. Opportunity for questions and clarification provided. Patient given 0 scripts. To continue your aftercare when you leave the hospital, you may receive an automated call from our care team to check in on how you are doing. This is a free service and part of our promise to provide the best care and service to meet your aftercare needs.  If you have questions, or wish to unsubscribe from this service please call [phone]. Thank you for Choosing our [facility] Emergency Department.         Benjamin Yeboah RN  03/11/23 3670

## 2023-03-11 NOTE — ED TRIAGE NOTES
Pt c/o dyspnea since Sunday, tested neg for strep/flu/covid then told bronchitis.  Pt reports continued dyspnea and bilateral pain to ribs (-)cough  Pt has been using nasal spray and abx   A&Ox4     Pt using crutches for tendonitis L foot

## 2023-03-14 ENCOUNTER — TELEPHONE (OUTPATIENT)
Dept: ORTHOPEDIC SURGERY | Age: 38
End: 2023-03-14

## 2023-03-27 ENCOUNTER — OFFICE VISIT (OUTPATIENT)
Dept: ORTHOPEDIC SURGERY | Age: 38
End: 2023-03-27
Payer: COMMERCIAL

## 2023-03-27 DIAGNOSIS — M76.822 TIBIALIS TENDINITIS OF LEFT LOWER EXTREMITY: Primary | ICD-10-CM

## 2023-03-27 DIAGNOSIS — M67.02 CONTRACTURE OF LEFT ACHILLES TENDON: ICD-10-CM

## 2023-03-27 PROCEDURE — 99214 OFFICE O/P EST MOD 30 MIN: CPT | Performed by: ORTHOPAEDIC SURGERY

## 2023-03-27 NOTE — PROGRESS NOTES
possible flexor digitorum longus tendon transfer, medial calcaneal slide osteotomy, gastrocnemius recession, and possible cotton osteotomy  Outpatient-1 hour-sagittal saw with long blade, Arthrex 6.5 millimeter screws, C arm, possible Bio-Tenodesis set    The patient understands the diagnosis with conservative and surgical treatment. Surgery, the risks and complications, and the expected postoperative course were all outlined. Understands generalized risks and complications associated with any surgery, but specifically with the posterior tibial tendon repair/reconstruction with or without Achilles lengthening in the calcaneal osteotomy, understands malposition, malunion, and/or delayed union, any of which may require repeat surgical treatment. Understands the risk of skin and deep infection, the risk of bone infection, and the use of internal and possible external fixation that may require future hardware removal.  Understands the goal of surgery is attempted realignment and pain reduction with tendon healing but not complete pain relief. Understands posterior tibial tendon failure and the possible need to proceed with a fusion or long-term use of a brace or insole. Understands the risks of skin and deep infection, the risk of bone infection. Patient accepts and would like to proceed.

## 2023-03-28 DIAGNOSIS — M76.822 TIBIALIS TENDINITIS OF LEFT LOWER EXTREMITY: Primary | ICD-10-CM

## 2023-03-28 DIAGNOSIS — M67.02 CONTRACTURE OF LEFT ACHILLES TENDON: ICD-10-CM

## 2023-03-28 RX ORDER — DIPHENHYDRAMINE HCL 25 MG
50 TABLET ORAL NIGHTLY
COMMUNITY

## 2023-03-29 ENCOUNTER — TELEPHONE (OUTPATIENT)
Dept: ORTHOPEDIC SURGERY | Age: 38
End: 2023-03-29

## 2023-03-31 ENCOUNTER — TELEPHONE (OUTPATIENT)
Dept: ORTHOPEDIC SURGERY | Age: 38
End: 2023-03-31

## 2023-04-13 ENCOUNTER — HOSPITAL ENCOUNTER (OUTPATIENT)
Age: 38
Setting detail: OUTPATIENT SURGERY
Discharge: HOME | End: 2023-04-13
Attending: ORTHOPAEDIC SURGERY | Admitting: ORTHOPAEDIC SURGERY
Payer: COMMERCIAL

## 2023-04-13 ENCOUNTER — APPOINTMENT (OUTPATIENT)
Dept: GENERAL RADIOLOGY | Age: 38
End: 2023-04-13
Attending: ORTHOPAEDIC SURGERY
Payer: COMMERCIAL

## 2023-04-13 VITALS
SYSTOLIC BLOOD PRESSURE: 161 MMHG | BODY MASS INDEX: 43.32 KG/M2 | RESPIRATION RATE: 16 BRPM | DIASTOLIC BLOOD PRESSURE: 77 MMHG | OXYGEN SATURATION: 95 % | WEIGHT: 260 LBS | TEMPERATURE: 98.1 F | HEART RATE: 79 BPM | HEIGHT: 65 IN

## 2023-04-13 DIAGNOSIS — G89.18 ACUTE POST-OPERATIVE PAIN: Primary | ICD-10-CM

## 2023-04-13 DIAGNOSIS — M67.02 CONTRACTURE OF LEFT ACHILLES TENDON: ICD-10-CM

## 2023-04-13 DIAGNOSIS — M76.822 TIBIALIS TENDINITIS OF LEFT LOWER EXTREMITY: ICD-10-CM

## 2023-04-13 PROCEDURE — 28300 INCISION OF HEEL BONE: CPT | Performed by: ORTHOPAEDIC SURGERY

## 2023-04-13 PROCEDURE — 6370000000 HC RX 637 (ALT 250 FOR IP): Performed by: ANESTHESIOLOGY

## 2023-04-13 PROCEDURE — 2720000010 HC SURG SUPPLY STERILE: Performed by: ORTHOPAEDIC SURGERY

## 2023-04-13 PROCEDURE — 7100000001 HC PACU RECOVERY - ADDTL 15 MIN: Performed by: ORTHOPAEDIC SURGERY

## 2023-04-13 PROCEDURE — 28304 INCISION OF MIDFOOT BONES: CPT | Performed by: ORTHOPAEDIC SURGERY

## 2023-04-13 PROCEDURE — 7100000000 HC PACU RECOVERY - FIRST 15 MIN: Performed by: ORTHOPAEDIC SURGERY

## 2023-04-13 PROCEDURE — C1713 ANCHOR/SCREW BN/BN,TIS/BN: HCPCS | Performed by: ORTHOPAEDIC SURGERY

## 2023-04-13 PROCEDURE — 27691 REVISE LOWER LEG TENDON: CPT | Performed by: ORTHOPAEDIC SURGERY

## 2023-04-13 PROCEDURE — 2580000003 HC RX 258: Performed by: ANESTHESIOLOGY

## 2023-04-13 PROCEDURE — 2709999900 HC NON-CHARGEABLE SUPPLY: Performed by: ORTHOPAEDIC SURGERY

## 2023-04-13 PROCEDURE — 6360000002 HC RX W HCPCS: Performed by: ANESTHESIOLOGY

## 2023-04-13 PROCEDURE — 27698 REPAIR OF ANKLE LIGAMENT: CPT | Performed by: ORTHOPAEDIC SURGERY

## 2023-04-13 PROCEDURE — 3600000014 HC SURGERY LEVEL 4 ADDTL 15MIN: Performed by: ORTHOPAEDIC SURGERY

## 2023-04-13 PROCEDURE — 3700000000 HC ANESTHESIA ATTENDED CARE: Performed by: ORTHOPAEDIC SURGERY

## 2023-04-13 PROCEDURE — 27687 REVISION OF CALF TENDON: CPT | Performed by: ORTHOPAEDIC SURGERY

## 2023-04-13 PROCEDURE — 3600000004 HC SURGERY LEVEL 4 BASE: Performed by: ORTHOPAEDIC SURGERY

## 2023-04-13 PROCEDURE — 3700000001 HC ADD 15 MINUTES (ANESTHESIA): Performed by: ORTHOPAEDIC SURGERY

## 2023-04-13 PROCEDURE — 7100000011 HC PHASE II RECOVERY - ADDTL 15 MIN: Performed by: ORTHOPAEDIC SURGERY

## 2023-04-13 PROCEDURE — C1769 GUIDE WIRE: HCPCS | Performed by: ORTHOPAEDIC SURGERY

## 2023-04-13 PROCEDURE — 7100000010 HC PHASE II RECOVERY - FIRST 15 MIN: Performed by: ORTHOPAEDIC SURGERY

## 2023-04-13 DEVICE — SCREW INTFR L15MM DIA4.75MM BIOCOMPOSITE BIO-TENODESIS: Type: IMPLANTABLE DEVICE | Site: FOOT | Status: FUNCTIONAL

## 2023-04-13 DEVICE — SCREW BNE L55MM DIA6.7MM THRD L18MM FT ANK TI CANN LO PROF: Type: IMPLANTABLE DEVICE | Site: FOOT | Status: FUNCTIONAL

## 2023-04-13 DEVICE — WEDGE ANK D20MM THK7.5MM COT TECH TI PORUS ANAT 3D OPN: Type: IMPLANTABLE DEVICE | Site: FOOT | Status: FUNCTIONAL

## 2023-04-13 RX ORDER — SODIUM CHLORIDE 0.9 % (FLUSH) 0.9 %
5-40 SYRINGE (ML) INJECTION PRN
Status: DISCONTINUED | OUTPATIENT
Start: 2023-04-13 | End: 2023-04-13 | Stop reason: HOSPADM

## 2023-04-13 RX ORDER — DIPHENHYDRAMINE HYDROCHLORIDE 50 MG/ML
6.25 INJECTION INTRAMUSCULAR; INTRAVENOUS
Status: DISCONTINUED | OUTPATIENT
Start: 2023-04-13 | End: 2023-04-13 | Stop reason: HOSPADM

## 2023-04-13 RX ORDER — SODIUM CHLORIDE 0.9 % (FLUSH) 0.9 %
5-40 SYRINGE (ML) INJECTION EVERY 12 HOURS SCHEDULED
Status: DISCONTINUED | OUTPATIENT
Start: 2023-04-13 | End: 2023-04-13 | Stop reason: HOSPADM

## 2023-04-13 RX ORDER — SODIUM CHLORIDE, SODIUM LACTATE, POTASSIUM CHLORIDE, CALCIUM CHLORIDE 600; 310; 30; 20 MG/100ML; MG/100ML; MG/100ML; MG/100ML
INJECTION, SOLUTION INTRAVENOUS CONTINUOUS
Status: DISCONTINUED | OUTPATIENT
Start: 2023-04-13 | End: 2023-04-13 | Stop reason: HOSPADM

## 2023-04-13 RX ORDER — SODIUM CHLORIDE 9 MG/ML
INJECTION, SOLUTION INTRAVENOUS CONTINUOUS
Status: DISCONTINUED | OUTPATIENT
Start: 2023-04-13 | End: 2023-04-13 | Stop reason: HOSPADM

## 2023-04-13 RX ORDER — HYDROMORPHONE HYDROCHLORIDE 2 MG/ML
0.25 INJECTION, SOLUTION INTRAMUSCULAR; INTRAVENOUS; SUBCUTANEOUS EVERY 5 MIN PRN
Status: DISCONTINUED | OUTPATIENT
Start: 2023-04-13 | End: 2023-04-13 | Stop reason: HOSPADM

## 2023-04-13 RX ORDER — ONDANSETRON 2 MG/ML
4 INJECTION INTRAMUSCULAR; INTRAVENOUS
Status: COMPLETED | OUTPATIENT
Start: 2023-04-13 | End: 2023-04-13

## 2023-04-13 RX ORDER — SODIUM CHLORIDE 9 MG/ML
INJECTION, SOLUTION INTRAVENOUS PRN
Status: DISCONTINUED | OUTPATIENT
Start: 2023-04-13 | End: 2023-04-13 | Stop reason: HOSPADM

## 2023-04-13 RX ORDER — OXYCODONE HYDROCHLORIDE 5 MG/1
5 TABLET ORAL PRN
Status: COMPLETED | OUTPATIENT
Start: 2023-04-13 | End: 2023-04-13

## 2023-04-13 RX ORDER — LIDOCAINE HYDROCHLORIDE 10 MG/ML
1 INJECTION, SOLUTION INFILTRATION; PERINEURAL
Status: DISCONTINUED | OUTPATIENT
Start: 2023-04-13 | End: 2023-04-13 | Stop reason: HOSPADM

## 2023-04-13 RX ORDER — CEPHALEXIN 500 MG/1
500 CAPSULE ORAL 4 TIMES DAILY
Qty: 12 CAPSULE | Refills: 0 | Status: SHIPPED | OUTPATIENT
Start: 2023-04-13

## 2023-04-13 RX ORDER — ASPIRIN 81 MG/1
81 TABLET ORAL 2 TIMES DAILY
Qty: 84 TABLET | Refills: 0 | Status: SHIPPED | OUTPATIENT
Start: 2023-04-13

## 2023-04-13 RX ORDER — MIDAZOLAM HYDROCHLORIDE 2 MG/2ML
2 INJECTION, SOLUTION INTRAMUSCULAR; INTRAVENOUS
Status: COMPLETED | OUTPATIENT
Start: 2023-04-13 | End: 2023-04-13

## 2023-04-13 RX ORDER — HYDROMORPHONE HYDROCHLORIDE 2 MG/ML
0.5 INJECTION, SOLUTION INTRAMUSCULAR; INTRAVENOUS; SUBCUTANEOUS EVERY 5 MIN PRN
Status: DISCONTINUED | OUTPATIENT
Start: 2023-04-13 | End: 2023-04-13 | Stop reason: HOSPADM

## 2023-04-13 RX ORDER — FENTANYL CITRATE 50 UG/ML
100 INJECTION, SOLUTION INTRAMUSCULAR; INTRAVENOUS
Status: COMPLETED | OUTPATIENT
Start: 2023-04-13 | End: 2023-04-13

## 2023-04-13 RX ORDER — FENTANYL CITRATE 50 UG/ML
25 INJECTION, SOLUTION INTRAMUSCULAR; INTRAVENOUS
Status: COMPLETED | OUTPATIENT
Start: 2023-04-13 | End: 2023-04-13

## 2023-04-13 RX ORDER — OXYCODONE HYDROCHLORIDE 5 MG/1
10 TABLET ORAL PRN
Status: COMPLETED | OUTPATIENT
Start: 2023-04-13 | End: 2023-04-13

## 2023-04-13 RX ORDER — OXYCODONE HYDROCHLORIDE 5 MG/1
5 TABLET ORAL EVERY 6 HOURS PRN
Qty: 20 TABLET | Refills: 0 | Status: SHIPPED | OUTPATIENT
Start: 2023-04-13 | End: 2023-04-18

## 2023-04-13 RX ADMIN — SODIUM CHLORIDE, SODIUM LACTATE, POTASSIUM CHLORIDE, AND CALCIUM CHLORIDE: 600; 310; 30; 20 INJECTION, SOLUTION INTRAVENOUS at 10:30

## 2023-04-13 RX ADMIN — ONDANSETRON 4 MG: 2 INJECTION INTRAMUSCULAR; INTRAVENOUS at 11:14

## 2023-04-13 RX ADMIN — FENTANYL CITRATE 100 MCG: 50 INJECTION, SOLUTION INTRAMUSCULAR; INTRAVENOUS at 10:35

## 2023-04-13 RX ADMIN — ONDANSETRON 4 MG: 2 INJECTION INTRAMUSCULAR; INTRAVENOUS at 13:51

## 2023-04-13 RX ADMIN — OXYCODONE 5 MG: 5 TABLET ORAL at 14:00

## 2023-04-13 RX ADMIN — MIDAZOLAM 2 MG: 1 INJECTION INTRAMUSCULAR; INTRAVENOUS at 10:35

## 2023-04-13 ASSESSMENT — PAIN DESCRIPTION - LOCATION: LOCATION: ANKLE

## 2023-04-13 ASSESSMENT — PAIN DESCRIPTION - ORIENTATION: ORIENTATION: LEFT

## 2023-04-13 ASSESSMENT — PAIN DESCRIPTION - DESCRIPTORS: DESCRIPTORS: ACHING

## 2023-04-13 ASSESSMENT — PAIN - FUNCTIONAL ASSESSMENT
PAIN_FUNCTIONAL_ASSESSMENT: 0-10
PAIN_FUNCTIONAL_ASSESSMENT: PREVENTS OR INTERFERES SOME ACTIVE ACTIVITIES AND ADLS

## 2023-04-13 ASSESSMENT — PAIN SCALES - GENERAL: PAINLEVEL_OUTOF10: 8

## 2023-04-13 NOTE — H&P
Outpatient Surgery History and Physical:  Estefani Davalos was seen and examined. CHIEF COMPLAINT:   left foot. PE:   Ht 5' 5\" (1.651 m)   Wt 260 lb (117.9 kg)   LMP 03/14/2023   BMI 43.27 kg/m²     Heart:   Regular rhythm      Lungs:  Are clear      Past Medical History:    Past Medical History:   Diagnosis Date    Asthma     prn inhaler--    History of kidney stones     x 3-- none in several yrs per pt    Left foot pain     Migraines     Morbid obesity (HCC)     BMI 43    Unspecified sleep apnea     does not wear cpap at hs    Vertigo     r/t migraines       Surgical History:   Past Surgical History:   Procedure Laterality Date    HEENT      tonsil--as teen    LITHOTRIPSY Left 2015       Social History: Patient  reports that she has never smoked. She has never used smokeless tobacco. She reports current alcohol use. She reports that she does not use drugs. Family History:   Family History   Problem Relation Age of Onset    Hypertension Mother     Diabetes Father     Cancer Maternal Grandmother     Cancer Paternal Grandmother     Cancer Paternal Grandfather        Allergies: Reviewed per EMR  Patient has no known allergies. Medications:    Prior to Admission medications    Medication Sig Start Date End Date Taking?  Authorizing Provider   diphenhydrAMINE (BENADRYL) 25 MG tablet Take 50 mg by mouth at bedtime   Yes Historical Provider, MD   meloxicam (MOBIC) 15 MG tablet Take 1 tablet by mouth daily 3/1/23   Tiny Cleveland III, MD   acetaminophen (TYLENOL) 325 MG tablet Take 325 mg by mouth every 4 hours as needed    Ar Automatic Reconciliation   albuterol sulfate  (90 Base) MCG/ACT inhaler Inhale into the lungs every 4 hours as needed    Ar Automatic Reconciliation       The surgery is planned for the Left Spring ligament reconstruction with posterior tibial tendon reconstruction and possible flexor digitorum longus tendon transfer, medial calcaneal slide osteotomy, gastrocnemius recession, and

## 2023-04-13 NOTE — PERIOP NOTE
Pt still has sl nausea at intervals, asked her if she wanted me to speak to anesthesia about more medication for nausea and she said no, I just need more time,  at bedside, pt has has crackers and soda without emesis.

## 2023-04-13 NOTE — PERIOP NOTE
Pt states pain easing and nausea passing, eating belkys crackers and sipping on soda. Will dress for Dc soon.

## 2023-04-13 NOTE — OP NOTE
forefoot varus. It was elected to proceed with a cotton osteotomy. A separate incision was made dorsally over the medial cuneiform. An osteotomy was performed to the mid body of the medial cuneiform at that time. This was corrected using a Arthrex biosync wedge. The wound was then irrigated and closed using Monocryl nylon sutures. Sterile dressings and applied followed by well-padded posterior splint. Anesthesia was discontinued. The patient was transferred back to recovery bed. She was taken to recovery in satisfactory condition. She appeared to tolerate the procedure well. There were no apparent surgical or anesthetic complications. All needle and sponge counts were correct. A sterile dressing was then applied to the leg and Posterior slab splint. They were awoken from anesthesia and returned to the PACU without difficulty.     Post Operative Plan:   1- WB status: Non-Weight Bearing   2- Immobilization/assistive devices: crutches  3- DVT px: ASA 81 mg BID

## 2023-04-17 ENCOUNTER — TELEPHONE (OUTPATIENT)
Dept: ORTHOPEDIC SURGERY | Age: 38
End: 2023-04-17

## 2023-04-17 NOTE — TELEPHONE ENCOUNTER
She had surgery Thursday. She is having a painful itch in her cast. She is asking that someone call her and tell her what her options are.

## 2023-04-18 NOTE — TELEPHONE ENCOUNTER
Patient states she almost fell last Saturday night and is afraid she might have done something to her incision. I scheduled patient for an appointment tomorrow morning at the [facility] office.

## 2023-04-19 ENCOUNTER — OFFICE VISIT (OUTPATIENT)
Dept: ORTHOPEDIC SURGERY | Age: 38
End: 2023-04-19

## 2023-04-19 DIAGNOSIS — Z48.01 DRESSING CHANGE OR REMOVAL, SURGICAL WOUND: Primary | ICD-10-CM

## 2023-04-19 DIAGNOSIS — M76.822 TIBIALIS TENDINITIS OF LEFT LOWER EXTREMITY: ICD-10-CM

## 2023-04-19 DIAGNOSIS — M67.02 CONTRACTURE OF LEFT ACHILLES TENDON: ICD-10-CM

## 2023-04-19 NOTE — PROGRESS NOTES
The patient was prescribed a walker boot for the patient's left foot. The patient wears a size 10 shoe and I fitted them with a L size boot. The patient was fitted and instructed on the use of prescribed walker boot. I explained how to fit themselves and that the plastic flexible piece should always be on the front of the boot and secured by the Velcro straps on top. The air bladder in the boot was adjusted according to proper fit and comfort. The patient walked a short distance and acknowledged satisfaction with current fit. I also explained that they need a heel lift or a higher heeled shoe for the uninvolved LE to help normalize gait and avoid excessive low back stress/strain due to leg length inequality created from walker boot. Patient read and signed documenting they understand and agree to [facility]'s current DME return policy.

## 2023-04-19 NOTE — PROGRESS NOTES
Patient was seen 6 days after flatfoot reconstruction due to a fall postoperatively in her splint. She thought she had felt something \"pop\" and was concerned and requested to be seen today. Sterile splint was removed, incisional areas were all inspected, sutures are intact. She has palpable pedal pulse. She does not have any signs of DVT at this time, and presents with a negative [place] ' sign. No other physical exam was performed today. New sterile dressing was applied by myself and medical assistant Jeovanny Harris. Postoperative instructions were reviewed as far as continuing to be nonweightbearing, continuation of aspirin therapy, the importance of elevation, and not being able to get the extremity wet. Both the patient and her  verbalized understanding of today's conversation.   She will follow-up at her regular scheduled visit in approximately 10 days for suture removal.

## 2023-04-26 ENCOUNTER — OFFICE VISIT (OUTPATIENT)
Dept: ORTHOPEDIC SURGERY | Age: 38
End: 2023-04-26
Payer: COMMERCIAL

## 2023-04-26 ENCOUNTER — CLINICAL DOCUMENTATION (OUTPATIENT)
Dept: ORTHOPEDIC SURGERY | Age: 38
End: 2023-04-26

## 2023-04-26 DIAGNOSIS — M67.02 CONTRACTURE OF LEFT ACHILLES TENDON: ICD-10-CM

## 2023-04-26 DIAGNOSIS — M76.822 TIBIALIS TENDINITIS OF LEFT LOWER EXTREMITY: Primary | ICD-10-CM

## 2023-04-26 PROCEDURE — 99024 POSTOP FOLLOW-UP VISIT: CPT | Performed by: NURSE PRACTITIONER

## 2023-04-26 PROCEDURE — 29405 APPL SHORT LEG CAST: CPT | Performed by: NURSE PRACTITIONER

## 2023-04-26 NOTE — PROGRESS NOTES
Name: Josef Pedroza  YOB: 1985  Gender: female  MRN: [id number]    Procedure Performed:  Left gastrocnemius recession  Left medial displacement calcaneal osteotomy  Left posterior tibial tendon debridement with removal of accessory navicular and transfer of flexor digitorum longus tendon transfer  Left spring ligament reconstruction of the ankle  Left medial cuneiform opening wedge osteotomy        Date of Procedure: 04/13/2023      Subjective: Patient seems to be doing a lot better than on her last visit as far as pain control. She reports only taking pain medication to help her sleep at night. Physical Examination: Incisional areas all look good and are continue to heal well and show no signs of infection. She does have a few eczema breakouts in between her toes as well as on the dorsal aspect of her foot and in the arch plantarly. She usually uses cream for this but the areas have been unassessable due to her dressing. She has no signs of DVT at this time, denies of any calf or behind the knee pain and does present with a negative Homans' sign. She has a presence of an arch in her foot. She has mild yet expected swelling to the foot. Minimal range of motion with was performed due to patient's level of discomfort and guarding. Imaging:   No imaging reviewed          Assessment:   Status post left flatfoot reconstruction with removal of  navicular. Plan:   3 This is stable chronic illness/condition  Treatment at this time:  Sutures removed, Steri-Strips and short leg cast replaced today. Patient will continue to be nonweightbearing on the affected extremity, she will continue to take aspirin as DVT prophylaxis daily. She was encouraged continue elevating the affected extremity. She would not be allowed to get the extremity wet. She will follow-up in 3 weeks or sooner if needed with cast off and x-ray. Studies ordered:  Foot XR needed @ Next

## 2023-05-01 ENCOUNTER — CLINICAL DOCUMENTATION (OUTPATIENT)
Dept: ORTHOPEDIC SURGERY | Age: 38
End: 2023-05-01

## 2023-05-17 ENCOUNTER — OFFICE VISIT (OUTPATIENT)
Dept: ORTHOPEDIC SURGERY | Age: 38
End: 2023-05-17

## 2023-05-17 DIAGNOSIS — M67.02 CONTRACTURE OF LEFT ACHILLES TENDON: Primary | ICD-10-CM

## 2023-05-17 DIAGNOSIS — M76.822 TIBIALIS TENDINITIS OF LEFT LOWER EXTREMITY: ICD-10-CM

## 2023-05-17 PROCEDURE — 99024 POSTOP FOLLOW-UP VISIT: CPT | Performed by: ORTHOPAEDIC SURGERY

## 2023-05-17 NOTE — PROGRESS NOTES
Name: Serene Shirley  YOB: 1985  Gender: female  MRN: [id number]    Procedure Performed:left spring ligament reconstruction - Left, posterior tibial tendon reconstruction - Left, flexor digitorum longus tendon transfer - Left, medial calcaneal slide osteotomy - Left, gastrocnemius recession - Left, and cotton osteotomy - Left        Date of Procedure: 4/13/2023      Subjective: Doing well      Physical Examination: Incisions are well-healed. There is no sign of infected expected swelling. She has good correction of her arch. Imaging:   I independently interpreted XR taken today  Left foot XR: AP, Lateral, Oblique views     ICD-10-CM    1. Contracture of left Achilles tendon  M67.02 XR FOOT LEFT (MIN 3 VIEWS)      2. Tibialis tendinitis of left lower extremity  M76.822          Report: AP, lateral, oblique x-ray of the left foot demonstrates NO hardware failure    Impression: No hardware failure   Fern Figueredo III, MD           Assessment:   Left posterior tibial tendon reconstruction      Plan:   3 This is stable chronic illness/condition  Treatment at this time: Time with no intervention  Studies ordered: NO XR needed @ Next Visit    Weight-bearing status: WBAT        Return to work/work restrictions: none  No medications given      She begins weightbearing as tolerated in her walker boot today and start range of motion exercises. She will return in 4 weeks to wean out of the walker boot.

## 2023-05-18 ENCOUNTER — CLINICAL DOCUMENTATION (OUTPATIENT)
Dept: ORTHOPEDIC SURGERY | Age: 38
End: 2023-05-18

## 2023-05-22 ENCOUNTER — CLINICAL DOCUMENTATION (OUTPATIENT)
Dept: ORTHOPEDIC SURGERY | Age: 38
End: 2023-05-22

## 2023-05-23 RX ORDER — ASPIRIN 81 MG/1
TABLET ORAL
Qty: 84 TABLET | Refills: 0 | OUTPATIENT
Start: 2023-05-23

## 2023-06-19 ENCOUNTER — CLINICAL DOCUMENTATION (OUTPATIENT)
Dept: ORTHOPEDIC SURGERY | Age: 38
End: 2023-06-19

## 2023-07-19 ENCOUNTER — OFFICE VISIT (OUTPATIENT)
Dept: ORTHOPEDIC SURGERY | Age: 38
End: 2023-07-19

## 2023-07-19 DIAGNOSIS — M76.822 TIBIALIS TENDINITIS OF LEFT LOWER EXTREMITY: ICD-10-CM

## 2023-07-19 DIAGNOSIS — M67.02 CONTRACTURE OF LEFT ACHILLES TENDON: Primary | ICD-10-CM

## 2023-07-19 PROCEDURE — 99024 POSTOP FOLLOW-UP VISIT: CPT | Performed by: NURSE PRACTITIONER

## 2023-07-19 NOTE — PROGRESS NOTES
Name: Timothy Minaya  YOB: 1985  Gender: female  MRN: [id number]    Procedure Performed:  Left gastrocnemius recession  Left medial displacement calcaneal osteotomy  Left posterior tibial tendon debridement with removal of accessory navicular and transfer of flexor digitorum longus tendon transfer  Left spring ligament reconstruction of the ankle  Left medial cuneiform opening wedge osteotomy           Date of Procedure: 04/13/2023      Subjective: Patient reports that she was doing okay however feels like she overdid it shopping in the grocery store without using a cart one day and then the next day went straight to therapy and since then has placed herself back in the boot due to increased pain to the dorsal aspect of her foot. Physical Examination: Patient's incisions are still continuing to heal well and show no signs of infection. She has palpable pulses and intact sensation of the foot. She does still have some swelling to the dorsal aspect of her foot. The area of concern is swollen at the base of the second and third MTP joints, there is no bruising to this area and there is no pain with palpation to the areas plantarly. She has an arch in her foot. Imaging:   I independently interpreted XR taken today  Left foot XR: AP, Lateral, Oblique views     ICD-10-CM    1. Contracture of left Achilles tendon  M67.02 XR FOOT LEFT (MIN 3 VIEWS)      2. Tibialis tendinitis of left lower extremity  M76.822 XR FOOT LEFT (MIN 3 VIEWS)         Report: AP, lateral, oblique x-ray of the left foot demonstrates no hardware failures and no new acute findings or fractures    Impression: No hardware failures and no new acute findings or fractures. Viry Orellana, APRN - CNP           Assessment:   Status post flatfoot reconstruction. She will refrain from therapy at this time, she will use the walker boot as needed.   She is ready to return to work however she is not yet able to fit into

## 2023-07-21 ENCOUNTER — CLINICAL DOCUMENTATION (OUTPATIENT)
Dept: ORTHOPEDIC SURGERY | Age: 38
End: 2023-07-21

## 2023-07-26 ENCOUNTER — CLINICAL DOCUMENTATION (OUTPATIENT)
Dept: ORTHOPEDIC SURGERY | Age: 38
End: 2023-07-26

## 2023-08-12 ENCOUNTER — HOSPITAL ENCOUNTER (EMERGENCY)
Age: 38
Discharge: HOME | End: 2023-08-12
Attending: EMERGENCY MEDICINE
Payer: COMMERCIAL

## 2023-08-12 ENCOUNTER — APPOINTMENT (OUTPATIENT)
Dept: GENERAL RADIOLOGY | Age: 38
End: 2023-08-12
Payer: COMMERCIAL

## 2023-08-12 VITALS
HEART RATE: 84 BPM | SYSTOLIC BLOOD PRESSURE: 129 MMHG | DIASTOLIC BLOOD PRESSURE: 86 MMHG | TEMPERATURE: 98.1 F | HEIGHT: 65 IN | BODY MASS INDEX: 41.65 KG/M2 | OXYGEN SATURATION: 97 % | RESPIRATION RATE: 18 BRPM | WEIGHT: 250 LBS

## 2023-08-12 DIAGNOSIS — U07.1 COVID-19: Primary | ICD-10-CM

## 2023-08-12 DIAGNOSIS — M62.838 TRAPEZIUS MUSCLE SPASM: ICD-10-CM

## 2023-08-12 LAB
ALBUMIN SERPL-MCNC: 3.5 G/DL (ref 3.5–5)
ALBUMIN/GLOB SERPL: 0.8 (ref 0.4–1.6)
ALP SERPL-CCNC: 88 U/L (ref 50–136)
ALT SERPL-CCNC: 14 U/L (ref 12–65)
ANION GAP SERPL CALC-SCNC: 6 MMOL/L (ref 2–11)
AST SERPL-CCNC: 5 U/L (ref 15–37)
BASOPHILS # BLD: 0.1 K/UL (ref 0–0.2)
BASOPHILS NFR BLD: 1 % (ref 0–2)
BILIRUB SERPL-MCNC: 0.4 MG/DL (ref 0.2–1.1)
BILIRUB UR QL: NEGATIVE
BUN SERPL-MCNC: 14 MG/DL (ref 6–23)
CALCIUM SERPL-MCNC: 9.1 MG/DL (ref 8.3–10.4)
CHLORIDE SERPL-SCNC: 107 MMOL/L (ref 101–110)
CO2 SERPL-SCNC: 26 MMOL/L (ref 21–32)
CREAT SERPL-MCNC: 0.9 MG/DL (ref 0.6–1)
D DIMER PPP FEU-MCNC: <0.27 UG/ML(FEU)
DIFFERENTIAL METHOD BLD: ABNORMAL
EOSINOPHIL # BLD: 0.1 K/UL (ref 0–0.8)
EOSINOPHIL NFR BLD: 1 % (ref 0.5–7.8)
ERYTHROCYTE [DISTWIDTH] IN BLOOD BY AUTOMATED COUNT: 13.4 % (ref 11.9–14.6)
GLOBULIN SER CALC-MCNC: 4.4 G/DL (ref 2.8–4.5)
GLUCOSE SERPL-MCNC: 109 MG/DL (ref 65–100)
GLUCOSE UR QL STRIP.AUTO: NEGATIVE MG/DL
HCG UR QL: NEGATIVE
HCT VFR BLD AUTO: 40.4 % (ref 35.8–46.3)
HGB BLD-MCNC: 13.1 G/DL (ref 11.7–15.4)
IMM GRANULOCYTES # BLD AUTO: 0 K/UL (ref 0–0.5)
IMM GRANULOCYTES NFR BLD AUTO: 0 % (ref 0–5)
KETONES UR-MCNC: NEGATIVE MG/DL
LACTATE SERPL-SCNC: 1.8 MMOL/L (ref 0.4–2)
LEUKOCYTE ESTERASE UR QL STRIP: NEGATIVE
LYMPHOCYTES # BLD: 2.7 K/UL (ref 0.5–4.6)
LYMPHOCYTES NFR BLD: 19 % (ref 13–44)
MCH RBC QN AUTO: 28.8 PG (ref 26.1–32.9)
MCHC RBC AUTO-ENTMCNC: 32.4 G/DL (ref 31.4–35)
MCV RBC AUTO: 88.8 FL (ref 82–102)
MONOCYTES # BLD: 0.9 K/UL (ref 0.1–1.3)
MONOCYTES NFR BLD: 6 % (ref 4–12)
NEUTS SEG # BLD: 10.3 K/UL (ref 1.7–8.2)
NEUTS SEG NFR BLD: 73 % (ref 43–78)
NITRITE UR QL: NEGATIVE
NRBC # BLD: 0 K/UL (ref 0–0.2)
NT PRO BNP: 16 PG/ML (ref 5–125)
PH UR: 6 (ref 5–9)
PLATELET # BLD AUTO: 463 K/UL (ref 150–450)
PMV BLD AUTO: 8.9 FL (ref 9.4–12.3)
POTASSIUM SERPL-SCNC: 3.7 MMOL/L (ref 3.5–5.1)
PROCALCITONIN SERPL-MCNC: <0.05 NG/ML (ref 0–0.49)
PROT SERPL-MCNC: 7.9 G/DL (ref 6.3–8.2)
PROT UR QL: NEGATIVE MG/DL
RBC # BLD AUTO: 4.55 M/UL (ref 4.05–5.2)
RBC # UR STRIP: ABNORMAL
SARS-COV-2 RDRP RESP QL NAA+PROBE: ABNORMAL
SERVICE CMNT-IMP: ABNORMAL
SODIUM SERPL-SCNC: 139 MMOL/L (ref 133–143)
SOURCE: ABNORMAL
SP GR UR: >1.03 (ref 1–1.02)
TROPONIN I SERPL HS-MCNC: <3 PG/ML (ref 0–14)
UROBILINOGEN UR QL: 0.2 EU/DL (ref 0.2–1)
WBC # BLD AUTO: 14.1 K/UL (ref 4.3–11.1)

## 2023-08-12 PROCEDURE — 83880 ASSAY OF NATRIURETIC PEPTIDE: CPT

## 2023-08-12 PROCEDURE — 87635 SARS-COV-2 COVID-19 AMP PRB: CPT

## 2023-08-12 PROCEDURE — 96374 THER/PROPH/DIAG INJ IV PUSH: CPT

## 2023-08-12 PROCEDURE — 84145 PROCALCITONIN (PCT): CPT

## 2023-08-12 PROCEDURE — 81003 URINALYSIS AUTO W/O SCOPE: CPT

## 2023-08-12 PROCEDURE — 6370000000 HC RX 637 (ALT 250 FOR IP)

## 2023-08-12 PROCEDURE — 80053 COMPREHEN METABOLIC PANEL: CPT

## 2023-08-12 PROCEDURE — 84484 ASSAY OF TROPONIN QUANT: CPT

## 2023-08-12 PROCEDURE — 83605 ASSAY OF LACTIC ACID: CPT

## 2023-08-12 PROCEDURE — 81025 URINE PREGNANCY TEST: CPT

## 2023-08-12 PROCEDURE — 99285 EMERGENCY DEPT VISIT HI MDM: CPT

## 2023-08-12 PROCEDURE — 93005 ELECTROCARDIOGRAM TRACING: CPT

## 2023-08-12 PROCEDURE — 6360000002 HC RX W HCPCS

## 2023-08-12 PROCEDURE — 85379 FIBRIN DEGRADATION QUANT: CPT

## 2023-08-12 PROCEDURE — 94640 AIRWAY INHALATION TREATMENT: CPT

## 2023-08-12 PROCEDURE — 71046 X-RAY EXAM CHEST 2 VIEWS: CPT

## 2023-08-12 PROCEDURE — 85025 COMPLETE CBC W/AUTO DIFF WBC: CPT

## 2023-08-12 RX ORDER — METHOCARBAMOL 750 MG/1
750 TABLET, FILM COATED ORAL 4 TIMES DAILY
Qty: 40 TABLET | Refills: 0 | Status: SHIPPED | OUTPATIENT
Start: 2023-08-12 | End: 2023-08-22

## 2023-08-12 RX ORDER — PREDNISONE 20 MG/1
20 TABLET ORAL DAILY
Qty: 5 TABLET | Refills: 0 | Status: SHIPPED | OUTPATIENT
Start: 2023-08-12 | End: 2023-08-17

## 2023-08-12 RX ORDER — ALBUTEROL SULFATE 90 UG/1
2 AEROSOL, METERED RESPIRATORY (INHALATION) EVERY 4 HOURS PRN
Qty: 6 EACH | Refills: 0 | Status: SHIPPED | OUTPATIENT
Start: 2023-08-12 | End: 2023-09-11

## 2023-08-12 RX ORDER — IPRATROPIUM BROMIDE AND ALBUTEROL SULFATE 2.5; .5 MG/3ML; MG/3ML
1 SOLUTION RESPIRATORY (INHALATION)
Status: COMPLETED | OUTPATIENT
Start: 2023-08-12 | End: 2023-08-12

## 2023-08-12 RX ORDER — KETOROLAC TROMETHAMINE 30 MG/ML
30 INJECTION, SOLUTION INTRAMUSCULAR; INTRAVENOUS
Status: COMPLETED | OUTPATIENT
Start: 2023-08-12 | End: 2023-08-12

## 2023-08-12 RX ADMIN — KETOROLAC TROMETHAMINE 30 MG: 30 INJECTION, SOLUTION INTRAMUSCULAR at 17:36

## 2023-08-12 RX ADMIN — IPRATROPIUM BROMIDE AND ALBUTEROL SULFATE 1 DOSE: .5; 3 SOLUTION RESPIRATORY (INHALATION) at 17:09

## 2023-08-12 ASSESSMENT — LIFESTYLE VARIABLES
HOW MANY STANDARD DRINKS CONTAINING ALCOHOL DO YOU HAVE ON A TYPICAL DAY: PATIENT DOES NOT DRINK
HOW OFTEN DO YOU HAVE A DRINK CONTAINING ALCOHOL: NEVER

## 2023-08-12 ASSESSMENT — ENCOUNTER SYMPTOMS
VOMITING: 0
WHEEZING: 0
ABDOMINAL PAIN: 0
NAUSEA: 0
DIARRHEA: 0
SHORTNESS OF BREATH: 1
COLOR CHANGE: 0
SORE THROAT: 1

## 2023-08-12 ASSESSMENT — PAIN SCALES - GENERAL: PAINLEVEL_OUTOF10: 7

## 2023-08-12 NOTE — DISCHARGE INSTRUCTIONS
You were evaluated in the emergency department today for cough, feeling short of breath, left-sided shoulder pain    You are positive for COVID-19  Rest of your lab work is very reassuring  No concerning findings on your chest x-ray you do have spasming of your left trapezius muscle        I have written you prescription for Robaxin. This is a muscle relaxer. Take caution and to understand of this medication affects you. Do not drink alcohol in this medication     I have written you prescription for steroid    I have refilled your albuterol inhaler prescription    Contact your primary care provider on Monday to schedule follow-up within the next week    With COVID-19, you need to rest, push fluids, alternate ibuprofen with Tylenol for pain and fever control.     Return to the emergency department for new or worsening chest pain, shortness of breath, signs and symptoms of stroke as listed in your discharge paperwork

## 2023-08-12 NOTE — ED TRIAGE NOTES
Pt arrives to ER with c/o chest pain and shob x2 weeks. Pt states having hx of asthma. Denies chf and copd.

## 2023-08-12 NOTE — ED PROVIDER NOTES
Emergency Department Provider Note       PCP: None None   Age: 41 y.o. Sex: female     DISPOSITION Decision To Discharge 08/12/2023 06:56:09 PM       ICD-10-CM    1. COVID-19  U07.1 predniSONE (DELTASONE) 20 MG tablet     albuterol sulfate HFA (PROVENTIL;VENTOLIN;PROAIR) 108 (90 Base) MCG/ACT inhaler      2. Trapezius muscle spasm  M62.838 methocarbamol (ROBAXIN-750) 750 MG tablet          Medical Decision Making     Complexity of Problems Addressed:  1 or more acute illnesses that pose a threat to life or bodily function. Data Reviewed and Analyzed:   I independently ordered and reviewed each unique test.  I reviewed external records: provider visit note from outside specialist.  I reviewed external records: previous lab results from outside ED. Reviewed notes from [facility] provider visit on 7/19/23, surgery follow-up from surgery in April  Reviewed prior labs      I independently ordered and interpreted the ED EKG in the absence of a Cardiologist.    Rate: 98  EKG Interpretation: Sinus rhythm, normal axis, QT/QTc 406/518: Low voltage QRS  ST Segments: No STEMI  I independently interpreted the cardiac monitor rhythm strip sinus rhythm, rate 98. I interpreted the X-rays x-ray negative for pleural effusion, pneumonia, pneumothorax, agreement radiologist interpretation. Discussion of management or test interpretation. Vital signs reviewed, patient stable, NAD, afebrile, nontoxic in appearance   Heart rate 99, O2 saturation 100% on room air    In summary this is a 50-year-old female with history of asthma who presents to the emergency department today with 2 weeks of intermittent shortness of breath and intermittent chest pain primarily when she coughs. Denies any fevers, nausea, vomiting, diarrhea or diaphoresis. Denies any abdominal pain or dysuria. States she did fly to [place] 2 weeks ago. Denies prior history of DVT or PE. Has not been able to find her albuterol inhaler.   Also states she is having Natty Bailey MD at [address] [facility] Left 4/13/2023    medial calcaneal slide osteotomy performed by Augustin Boogie MD at [address] [facility] Left 4/13/2023    flexor digitorum longus tendon transfer performed by Augustin Boogie MD at [address] [facility] Left 4/13/2023    gastrocnemius recession performed by Augustin Boogie MD at [address] [facility] Left 4/13/2023    cotton osteotomy performed by Augustin Boogie MD at [address] [facility]      tonsil--as teen    LITHOTRIPSY Left 2015    OSTEOTOMY Left 4/13/2023    posterior tibial tendon reconstruction performed by Augustin Boogie MD at [address] [facility] History     Socioeconomic History    Marital status:      Spouse name: None    Number of children: None    Years of education: None    Highest education level: None   Tobacco Use    Smoking status: Never    Smokeless tobacco: Never   Vaping Use    Vaping Use: Never used   Substance and Sexual Activity    Alcohol use: Yes     Comment: occ    Drug use: No        Discharge Medication List as of 8/12/2023  7:02 PM        CONTINUE these medications which have NOT CHANGED    Details   diphenhydrAMINE (BENADRYL) 25 MG tablet Take 2 tablets by mouth at bedtimeHistorical Med      acetaminophen (TYLENOL) 325 MG tablet Take 1 tablet by mouth every 4 hours as neededHistorical Med              Results for orders placed or performed during the hospital encounter of 08/12/23   COVID-19, Rapid    Specimen: Nasopharyngeal   Result Value Ref Range    Source NASAL      SARS-CoV-2, Rapid RESULTS VERIFIED, PHONED TO AND READ BACK BY (A) NOTD     XR CHEST (2 VW)    Narrative    Chest 2 view    CLINICAL INDICATION: Acute moderate to severe shortness of breath with coughing,  subacute intermittent chest pains and dyspnea    COMPARISON: 3/11/2023    TECHNIQUE: Upright PA  and lateral views of the chest     FINDINGS: Lung volumes are well inflated.    Cardiomediastinal

## 2023-08-13 LAB
EKG ATRIAL RATE: 98 BPM
EKG DIAGNOSIS: NORMAL
EKG P AXIS: 33 DEGREES
EKG P-R INTERVAL: 156 MS
EKG Q-T INTERVAL: 406 MS
EKG QRS DURATION: 88 MS
EKG QTC CALCULATION (BAZETT): 518 MS
EKG R AXIS: -52 DEGREES
EKG T AXIS: 28 DEGREES
EKG VENTRICULAR RATE: 98 BPM

## 2023-08-13 PROCEDURE — 93010 ELECTROCARDIOGRAM REPORT: CPT | Performed by: INTERNAL MEDICINE

## 2023-08-27 VITALS
DIASTOLIC BLOOD PRESSURE: 89 MMHG | SYSTOLIC BLOOD PRESSURE: 133 MMHG | OXYGEN SATURATION: 99 % | TEMPERATURE: 98.3 F | RESPIRATION RATE: 16 BRPM | HEART RATE: 81 BPM

## 2023-08-27 PROCEDURE — 99284 EMERGENCY DEPT VISIT MOD MDM: CPT

## 2023-08-27 PROCEDURE — 96372 THER/PROPH/DIAG INJ SC/IM: CPT

## 2023-08-28 ENCOUNTER — APPOINTMENT (OUTPATIENT)
Dept: GENERAL RADIOLOGY | Age: 38
End: 2023-08-28
Payer: COMMERCIAL

## 2023-08-28 ENCOUNTER — HOSPITAL ENCOUNTER (EMERGENCY)
Age: 38
Discharge: HOME | End: 2023-08-28
Attending: EMERGENCY MEDICINE
Payer: COMMERCIAL

## 2023-08-28 DIAGNOSIS — M62.838 TRAPEZIUS MUSCLE SPASM: Primary | ICD-10-CM

## 2023-08-28 PROCEDURE — 6360000002 HC RX W HCPCS: Performed by: EMERGENCY MEDICINE

## 2023-08-28 PROCEDURE — 73030 X-RAY EXAM OF SHOULDER: CPT

## 2023-08-28 RX ORDER — IBUPROFEN 800 MG/1
800 TABLET ORAL EVERY 8 HOURS PRN
Qty: 12 TABLET | Refills: 0 | Status: SHIPPED | OUTPATIENT
Start: 2023-08-28 | End: 2023-09-01

## 2023-08-28 RX ORDER — KETOROLAC TROMETHAMINE 30 MG/ML
30 INJECTION, SOLUTION INTRAMUSCULAR; INTRAVENOUS
Status: COMPLETED | OUTPATIENT
Start: 2023-08-28 | End: 2023-08-28

## 2023-08-28 RX ORDER — LIDOCAINE 4 G/G
1 PATCH TOPICAL DAILY
Qty: 12 PATCH | Refills: 0 | Status: SHIPPED | OUTPATIENT
Start: 2023-08-28 | End: 2023-09-09

## 2023-08-28 RX ADMIN — KETOROLAC TROMETHAMINE 30 MG: 30 INJECTION, SOLUTION INTRAMUSCULAR at 01:01

## 2023-08-28 NOTE — ED PROVIDER NOTES
deficit present. Mental Status: She is alert and oriented to person, place, and time. Cranial Nerves: No cranial nerve deficit. Sensory: No sensory deficit. Motor: No weakness. Comments: No focal deficits.         Procedures     Procedures    Orders Placed This Encounter   Procedures    XR SHOULDER LEFT (MIN 2 VIEWS)        Medications given during this emergency department visit:  Medications   ketorolac (TORADOL) injection 30 mg (30 mg IntraMUSCular Given 8/28/23 0101)       Discharge Medication List as of 8/28/2023 12:57 AM           Past Medical History:   Diagnosis Date    Asthma     prn inhaler--    History of kidney stones     x 3-- none in several yrs per pt    Left foot pain     Migraines     Morbid obesity (HCC)     BMI 43    Unspecified sleep apnea     does not wear cpap at hs    Vertigo     r/t migraines        Past Surgical History:   Procedure Laterality Date    ANKLE SURGERY Left 4/13/2023    left spring ligament reconstruction performed by Josr Delgado MD at [address] [facility] Left 4/13/2023    medial calcaneal slide osteotomy performed by Josr Delgado MD at [address] [facility] Left 4/13/2023    flexor digitorum longus tendon transfer performed by Josr Delgado MD at [address] [facility] Left 4/13/2023    gastrocnemius recession performed by Josr Delgado MD at [address] [place] [address] Left 4/13/2023    cotton osteotomy performed by Josr Delgado MD at [address] [facility]      tonsil--as teen    LITHOTRIPSY Left 2015    OSTEOTOMY Left 4/13/2023    posterior tibial tendon reconstruction performed by Josr Delgado MD at [address] [facility] History     Socioeconomic History    Marital status:    Tobacco Use    Smoking status: Never    Smokeless tobacco: Never   Vaping Use    Vaping Use: Never used   Substance and Sexual Activity    Alcohol use: Yes     Comment: occ    Drug use: No        Discharge

## 2023-08-28 NOTE — ED NOTES
Pt arrives via POV with family c/o L shoulder pain. Pt had injury 1 year ago. Pt was seen at this facility and realsed with muscle relaxers with no relief.       Isi Willis RN  08/27/23 [id number]

## 2023-08-29 ASSESSMENT — ENCOUNTER SYMPTOMS
SHORTNESS OF BREATH: 0
COLOR CHANGE: 0
ABDOMINAL PAIN: 0
COUGH: 0
BACK PAIN: 0
VOMITING: 0
NAUSEA: 0

## 2023-09-13 ENCOUNTER — TELEPHONE (OUTPATIENT)
Dept: ORTHOPEDIC SURGERY | Age: 38
End: 2023-09-13

## 2023-09-13 NOTE — TELEPHONE ENCOUNTER
Told patient we just got her message and that we have no availability this week with Fely Oconnor (our NP)  being out. And we are currently over booked this week she should keep her appointment she has with us next week.

## 2023-09-13 NOTE — TELEPHONE ENCOUNTER
Call pt asap please  she  said  she  left a  previous message  but  I  did not  see one. On her  left sx  foot  she  felt  a pop on Saturday  she  has  put back on her  shoe  but  ask to  be  seen  today  or  Friday morning  instead of  next week.

## 2023-09-20 ENCOUNTER — CLINICAL DOCUMENTATION (OUTPATIENT)
Dept: ORTHOPEDIC SURGERY | Age: 38
End: 2023-09-20

## 2023-09-20 ENCOUNTER — OFFICE VISIT (OUTPATIENT)
Dept: ORTHOPEDIC SURGERY | Age: 38
End: 2023-09-20
Payer: COMMERCIAL

## 2023-09-20 DIAGNOSIS — M76.822 TIBIALIS TENDINITIS OF LEFT LOWER EXTREMITY: Primary | ICD-10-CM

## 2023-09-20 DIAGNOSIS — M67.02 CONTRACTURE OF LEFT ACHILLES TENDON: ICD-10-CM

## 2023-09-20 PROCEDURE — 99213 OFFICE O/P EST LOW 20 MIN: CPT | Performed by: NURSE PRACTITIONER

## 2023-09-20 NOTE — PROGRESS NOTES
Name: Timothy Minaya  YOB: 1985  Gender: female  MRN: [id number]    Procedure Performed:  Left gastrocnemius recession  Left medial displacement calcaneal osteotomy  Left posterior tibial tendon debridement with removal of accessory navicular and transfer of flexor digitorum longus tendon transfer  Left spring ligament reconstruction of the ankle  Left medial cuneiform opening wedge osteotomy           Date of Procedure: 04/13/2023      Subjective: Patient reports that she was okay and over the past couple weeks is felt a popping sensation throughout the arch of her foot and has since then been hurting and is almost painful for her to even walk. Physical Examination: All incisional areas are well-healed. There are no signs of infection. She has palpable pulses and intact sensation to the foot. She still has a presence of an arch throughout her foot. There is mild swelling throughout the dorsal aspect of her foot. She has pain with palpation throughout the arch of the foot. She is actively able to perform range of motion movements to the foot and has discomfort with eversion. Imaging:   I independently interpreted XR taken today  Left foot XR: AP, Lateral, Oblique views     ICD-10-CM    1. Tibialis tendinitis of left lower extremity  M76.822 XR FOOT LEFT (MIN 3 VIEWS)      2. Contracture of left Achilles tendon  M67.02 XR FOOT LEFT (MIN 3 VIEWS)         Report: AP, lateral, oblique x-ray of the left foot demonstrates healing osteotomy sites without hardware failures    Impression: Healing osteotomy sites without hardware failures   Viry Orellana APRN - CNP           Assessment:   Status post flatfoot reconstruction with new onset of plantar arch pain. With negative x-rays obtained today, we discussed the possibility whether or not the FDL was still intact from the tendon transfer.   Both the patient and I agreed that an MRI would be the best way to determine

## 2023-09-22 ENCOUNTER — CLINICAL DOCUMENTATION (OUTPATIENT)
Dept: ORTHOPEDIC SURGERY | Age: 38
End: 2023-09-22

## 2023-09-25 ENCOUNTER — TELEPHONE (OUTPATIENT)
Dept: ORTHOPEDIC SURGERY | Age: 38
End: 2023-09-25

## 2023-10-02 ENCOUNTER — OFFICE VISIT (OUTPATIENT)
Dept: ORTHOPEDIC SURGERY | Age: 38
End: 2023-10-02
Payer: COMMERCIAL

## 2023-10-02 DIAGNOSIS — M76.822 TIBIALIS TENDINITIS OF LEFT LOWER EXTREMITY: Primary | ICD-10-CM

## 2023-10-02 PROCEDURE — 99214 OFFICE O/P EST MOD 30 MIN: CPT | Performed by: ORTHOPAEDIC SURGERY

## 2023-10-02 RX ORDER — METHYLPREDNISOLONE 4 MG/1
TABLET ORAL
Qty: 1 KIT | Refills: 1 | Status: SHIPPED | OUTPATIENT
Start: 2023-10-02 | End: 2023-10-08

## 2023-10-02 RX ORDER — TRIAMCINOLONE ACETONIDE 1 MG/G
CREAM TOPICAL
COMMUNITY
Start: 2023-06-30

## 2023-10-02 RX ORDER — CYCLOBENZAPRINE HCL 5 MG
TABLET ORAL
COMMUNITY
Start: 2023-09-14

## 2023-10-02 RX ORDER — MELOXICAM 15 MG/1
15 TABLET ORAL DAILY
Qty: 30 TABLET | Refills: 3 | Status: SHIPPED | OUTPATIENT
Start: 2023-10-02

## 2023-10-02 RX ORDER — NAPROXEN SODIUM 220 MG
TABLET ORAL
COMMUNITY
Start: 2023-07-14

## 2023-10-02 NOTE — PROGRESS NOTES
Name: Nasima Corral  YOB: 1985  Gender: female  MRN: [id number]    Summary:     Left posterior tibial tendon insufficiency status post reconstruction     CC: Follow-up (MRI follow up Left foot )       HPI: Nasima Corral is a 41 y.o. female who presents with Follow-up (MRI follow up Left foot )  . This patient returns back the office today for follow-up of her MRI and for continued complaints of pain located in the plantar aspect of her left foot. History was obtained by Patient     ROS/Meds/PSH/PMH/FH/SH: I personally reviewed the patients standard intake form. Below are the pertinents    Tobacco:  reports that she has never smoked. She has never used smokeless tobacco.  Diabetes: None      Physical Examination:    Exam of the left foot and ankle shows well-healed surgical incisions good correction the patient's hindfoot valgus. There is no sign of infection or RSD. The repair appears to be intact. She does have some tenderness to palpation along her plantar fascia the plantar aspect of the foot    Imaging:   I independently interpreted an MRI ordered by a different physician, taken from an outside facility of the of the left foot shows an intact FDL tendon transfer and spring ligament tear as well as intact osteotomy with no evidence of other pathology           Candy Tate MD           Assessment:   Left Planter fasciitis status post flatfoot reconstruction    Treatment Plan:   4 This is a chronic illness/condition with exacerbation and progression  Treatment at this time: Physical Therapy and Prescription Drug Management  Studies ordered: NO XR needed @ Next Visit    Weight-bearing status: WBAT        Return to work/work restrictions: none  Mobic 15mg p.o. qday x 14 days: An Rx was given. We discussed the use of Mobic. I advised not to combine it with other NSAIDS such as advil, motrin, nor aleve.   I discussed Mobic and its affect on the GI system, its risk of ulcer

## 2023-10-12 ENCOUNTER — CLINICAL DOCUMENTATION (OUTPATIENT)
Dept: ORTHOPEDIC SURGERY | Age: 38
End: 2023-10-12

## 2023-10-25 ENCOUNTER — CLINICAL DOCUMENTATION (OUTPATIENT)
Dept: ORTHOPEDIC SURGERY | Age: 38
End: 2023-10-25

## 2023-10-27 ENCOUNTER — CLINICAL DOCUMENTATION (OUTPATIENT)
Dept: ORTHOPEDIC SURGERY | Age: 38
End: 2023-10-27

## 2023-11-01 ENCOUNTER — TELEPHONE (OUTPATIENT)
Dept: ORTHOPEDIC SURGERY | Age: 38
End: 2023-11-01

## 2023-11-01 NOTE — TELEPHONE ENCOUNTER
Patient has a question about a code/charge that was filed to her insurance, she asks for a return call please.

## 2023-11-06 ENCOUNTER — TELEPHONE (OUTPATIENT)
Dept: ORTHOPEDIC SURGERY | Age: 38
End: 2023-11-06

## 2023-11-06 NOTE — TELEPHONE ENCOUNTER
Pt called stating that paperwork isnt completely filled out she just got off the phone with short term disability please f/u with pt

## 2023-11-06 NOTE — TELEPHONE ENCOUNTER
Called pt and told her I would look at in office tomorrow to see what they are talking about and fix the issues or call her back for verification of what is wrong on the form

## 2023-11-07 ENCOUNTER — CLINICAL DOCUMENTATION (OUTPATIENT)
Dept: ORTHOPEDIC SURGERY | Age: 38
End: 2023-11-07

## 2023-12-01 ENCOUNTER — OFFICE VISIT (OUTPATIENT)
Dept: ORTHOPEDIC SURGERY | Age: 38
End: 2023-12-01
Payer: COMMERCIAL

## 2023-12-01 DIAGNOSIS — M76.822 TIBIALIS TENDINITIS OF LEFT LOWER EXTREMITY: ICD-10-CM

## 2023-12-01 DIAGNOSIS — M25.571 RIGHT ANKLE PAIN, UNSPECIFIED CHRONICITY: Primary | ICD-10-CM

## 2023-12-01 PROCEDURE — 99214 OFFICE O/P EST MOD 30 MIN: CPT | Performed by: ORTHOPAEDIC SURGERY

## 2023-12-01 NOTE — PROGRESS NOTES
Name: Sadia Jackson  YOB: 1985  Gender: female  MRN: [id number]    Summary:   Left posterior tibial tendon reconstruction       CC: No chief complaint on file. HPI: Sadia Jackson is a 41 y.o. female who presents with No chief complaint on file. .  Patient returns back to the office today with continued complaints of left foot pain after her history of a posterior tibial tendon reconstruction about 1 year ago. She has had multiple x-rays done postoperatively as well as an MRI performed outside all which show normal postsurgical changes. The physical therapy is not helped very much. She may be pregnant today. History was obtained by Patient     ROS/Meds/PSH/PMH/FH/SH: I personally reviewed the patients standard intake form. Below are the pertinents    Tobacco:  reports that she has never smoked. She has never used smokeless tobacco.  Diabetes: None      Physical Examination:    Nitin of the left foot shows well-healed surgical incisions with very little swelling. She does have some mild stiffness in the subtalar joint with pain. There is no sign of infection or reflex and pathetic dystrophy identified. Imaging:   No imaging reviewed           Cordell Deutsch III, MD           Assessment:   Posterior tibial tendon reconstruction    Treatment Plan:   4 This is a chronic illness/condition with exacerbation and progression  Treatment at this time: [place]heng Brace  Studies ordered: NO XR needed @ Next Visit    Weight-bearing status: WBAT        Return to work/work restrictions: none  No medications given    She is going to try an Sheng brace for this. She remain out of work for 3 months. She may be pregnant so any additional imaging or intervention is not possible at present. She will take Tylenol for pain. She can return in 3 months.

## 2024-01-03 ENCOUNTER — CLINICAL DOCUMENTATION (OUTPATIENT)
Dept: ORTHOPEDIC SURGERY | Age: 39
End: 2024-01-03

## 2024-02-04 SDOH — HEALTH STABILITY: PHYSICAL HEALTH: ON AVERAGE, HOW MANY DAYS PER WEEK DO YOU ENGAGE IN MODERATE TO STRENUOUS EXERCISE (LIKE A BRISK WALK)?: 0 DAYS

## 2024-02-04 SDOH — HEALTH STABILITY: PHYSICAL HEALTH: ON AVERAGE, HOW MANY MINUTES DO YOU ENGAGE IN EXERCISE AT THIS LEVEL?: 0 MIN

## 2024-02-07 ENCOUNTER — OFFICE VISIT (OUTPATIENT)
Dept: INTERNAL MEDICINE CLINIC | Facility: CLINIC | Age: 39
End: 2024-02-07
Payer: COMMERCIAL

## 2024-02-07 VITALS
WEIGHT: 271.4 LBS | HEIGHT: 65 IN | TEMPERATURE: 98.3 F | HEART RATE: 91 BPM | SYSTOLIC BLOOD PRESSURE: 108 MMHG | DIASTOLIC BLOOD PRESSURE: 87 MMHG | BODY MASS INDEX: 45.22 KG/M2 | OXYGEN SATURATION: 97 %

## 2024-02-07 DIAGNOSIS — L21.9 SEBORRHEIC DERMATITIS: ICD-10-CM

## 2024-02-07 DIAGNOSIS — J30.2 SEASONAL ALLERGIES: ICD-10-CM

## 2024-02-07 DIAGNOSIS — Z76.89 ESTABLISHING CARE WITH NEW DOCTOR, ENCOUNTER FOR: ICD-10-CM

## 2024-02-07 DIAGNOSIS — H61.23 CERUMEN DEBRIS ON TYMPANIC MEMBRANE, BILATERAL: ICD-10-CM

## 2024-02-07 DIAGNOSIS — J45.41 MODERATE PERSISTENT ASTHMA WITH ACUTE EXACERBATION: Primary | ICD-10-CM

## 2024-02-07 DIAGNOSIS — G47.33 OBSTRUCTIVE SLEEP APNEA SYNDROME: ICD-10-CM

## 2024-02-07 DIAGNOSIS — G43.809 VESTIBULAR MIGRAINE: ICD-10-CM

## 2024-02-07 DIAGNOSIS — S49.92XD INJURY OF LEFT SHOULDER, SUBSEQUENT ENCOUNTER: ICD-10-CM

## 2024-02-07 DIAGNOSIS — R29.818 ALTERATION IN HEARING STATUS: ICD-10-CM

## 2024-02-07 PROBLEM — S49.92XA INJURY OF LEFT SHOULDER: Status: ACTIVE | Noted: 2024-02-07

## 2024-02-07 PROCEDURE — 99204 OFFICE O/P NEW MOD 45 MIN: CPT | Performed by: INTERNAL MEDICINE

## 2024-02-07 RX ORDER — MECLIZINE HYDROCHLORIDE 25 MG/1
25 TABLET ORAL 4 TIMES DAILY
Qty: 120 TABLET | Refills: 3 | Status: SHIPPED | OUTPATIENT
Start: 2024-02-07

## 2024-02-07 RX ORDER — MECLIZINE HYDROCHLORIDE 25 MG/1
25 TABLET ORAL 4 TIMES DAILY
COMMUNITY
Start: 2023-12-08 | End: 2024-02-07 | Stop reason: SDUPTHER

## 2024-02-07 RX ORDER — FLUOCINOLONE ACETONIDE 0.11 MG/ML
OIL TOPICAL
Qty: 20 ML | Refills: 3 | Status: SHIPPED | OUTPATIENT
Start: 2024-02-07

## 2024-02-07 RX ORDER — LEVOCETIRIZINE DIHYDROCHLORIDE 5 MG/1
5 TABLET, FILM COATED ORAL NIGHTLY
Qty: 30 TABLET | Refills: 3 | Status: SHIPPED | OUTPATIENT
Start: 2024-02-07

## 2024-02-07 SDOH — ECONOMIC STABILITY: INCOME INSECURITY: HOW HARD IS IT FOR YOU TO PAY FOR THE VERY BASICS LIKE FOOD, HOUSING, MEDICAL CARE, AND HEATING?: SOMEWHAT HARD

## 2024-02-07 SDOH — ECONOMIC STABILITY: FOOD INSECURITY: WITHIN THE PAST 12 MONTHS, YOU WORRIED THAT YOUR FOOD WOULD RUN OUT BEFORE YOU GOT MONEY TO BUY MORE.: NEVER TRUE

## 2024-02-07 SDOH — ECONOMIC STABILITY: FOOD INSECURITY: WITHIN THE PAST 12 MONTHS, THE FOOD YOU BOUGHT JUST DIDN'T LAST AND YOU DIDN'T HAVE MONEY TO GET MORE.: NEVER TRUE

## 2024-02-07 SDOH — ECONOMIC STABILITY: HOUSING INSECURITY
IN THE LAST 12 MONTHS, WAS THERE A TIME WHEN YOU DID NOT HAVE A STEADY PLACE TO SLEEP OR SLEPT IN A SHELTER (INCLUDING NOW)?: NO

## 2024-02-07 ASSESSMENT — PATIENT HEALTH QUESTIONNAIRE - PHQ9
SUM OF ALL RESPONSES TO PHQ QUESTIONS 1-9: 0
SUM OF ALL RESPONSES TO PHQ9 QUESTIONS 1 & 2: 0
1. LITTLE INTEREST OR PLEASURE IN DOING THINGS: 0
2. FEELING DOWN, DEPRESSED OR HOPELESS: 0
SUM OF ALL RESPONSES TO PHQ QUESTIONS 1-9: 0

## 2024-02-07 NOTE — PROGRESS NOTES
Catalina Morrison (: 1985) is a 38 y.o. female, here for evaluation of the following chief complaint(s):  New Patient (Pt is here to estab PCP care.) and Dizziness (Pt states she had chronic vertigo. Pt states it was suggested that vertigo is related to migraines.)       ASSESSMENT/PLAN:  1. Moderate persistent asthma with acute exacerbation  Comments:  Maintained currently on Albuterol alone. Previously had breathing treatments. Has required steroids on at least 2 occasions in the past.  2. Establishing care with new doctor, encounter for  3. Vestibular migraine  Comments:  Patient with previous signfiicant work-up in the past. Symptoms began at the age of 14.             SUBJECTIVE/OBJECTIVE:  HPI: Patient is a 38-year-old woman with medical history significant for costochondritis, contracture of the left Achilles tendon, and ureteral stone presenting to establish care with new PCP.  Patient's blood pressure well-controlled at establishment.    Patient with vertigo associated with migraine disorder. She has been evaluated by Neurology in the past. Meclizine has been helpful though she has had a hard time obtaining.     Two children at home 9, 15.  to Zander.     Using benadryl for allergy relief.     Offering Xyxal for seasonal allergies.   Patient with new right sided hearing defects with 2 weeks of complete right sided hearing loss slightly improved with OTC spray purchased from [employer]. Patient with increased nasal congestion for the past day.     Offering referral to ENT for audiometry and evaluation of bilateral ears. Significant left sided cerumen accumulation of pale yellowish color.        Social History     Tobacco Use    Smoking status: Never    Smokeless tobacco: Never   Vaping Use    Vaping Use: Never used   Substance Use Topics    Alcohol use: Yes     Comment: occ    Drug use: No     Vitals:    24 1344   BP: 108/87   Site: Left Upper Arm   Position: Sitting   Cuff Size: Large

## 2024-02-07 NOTE — ASSESSMENT & PLAN NOTE
Evaluated by Neurology in the past. Relief during exacerbation with Meclizine. Refilling. Referring to ENT for evaluation of hearing defect and consideration of chronic vertiginous symptom treatment.

## 2024-02-07 NOTE — ACP (ADVANCE CARE PLANNING)
Advance Care Planning   The patient has the following advanced directives on file:  Advance Directives       Power of  Living Will ACP-Advance Directive ACP-Power of     Not on File Not on File Not on File Not on File            The patient has appointed the following active healthcare agents:    Primary Decision Maker: RUDI MORRISON - Spouse - [phone]    Secondary Decision Maker: JULIETAKATT ASH - Parent - [phone]    The Patient has the following current code status:    Code Status: Prior    Visit Documentation:  I discussed Advance Care Planning with Catalina Whittque today which included the importance of making their choices for care and treatment in the case of a health event that adversely affects their decision-making abilities. She has not completed the Advance Care Directives. She does not have an active health care agent at this time.  Catalina Morrison was encouraged to complete the declaration forms and provide a signed copy of her medical records.       Rita Moss  2/7/2024

## 2024-02-21 ENCOUNTER — PATIENT MESSAGE (OUTPATIENT)
Dept: ORTHOPEDIC SURGERY | Age: 39
End: 2024-02-21

## 2024-02-21 ENCOUNTER — TELEPHONE (OUTPATIENT)
Dept: ORTHOPEDIC SURGERY | Age: 39
End: 2024-02-21

## 2024-02-21 NOTE — TELEPHONE ENCOUNTER
Please advise when to schedule.  Thank you.    Appointment Request  (Newest Message First)  Catalina GIRON [facility]  Staff1 hour ago (2:50 PM)     SE  Appointment Request From: Catalina Morrison     With Provider: ARELI EPSTEIN III, MD [[facility]]     Preferred Date Range: 2/26/2024 - 2/27/2024     Preferred Times: Any Time     Reason for visit: Request an Appointment     Comments:  Offical release back to work after receiving my new brace

## 2024-02-21 NOTE — TELEPHONE ENCOUNTER
From: Catalina Morrison  To: Dr. Areli Chavez  Sent: 2/21/2024 2:50 PM EST  Subject: Appointment Request    Appointment Request From: Catalina Morrison    With Provider: ARELI CHAVEZ III, MD [[facility]]    Preferred Date Range: 2/26/2024 – 2/27/2024    Preferred Times: Any Time    Reason for visit: Request an Appointment    Comments:  Offical release back to work after receiving my new brace

## 2024-02-26 ENCOUNTER — OFFICE VISIT (OUTPATIENT)
Dept: ORTHOPEDIC SURGERY | Age: 39
End: 2024-02-26
Payer: COMMERCIAL

## 2024-02-26 ENCOUNTER — TELEPHONE (OUTPATIENT)
Dept: ORTHOPEDIC SURGERY | Age: 39
End: 2024-02-26

## 2024-02-26 DIAGNOSIS — M76.822 TIBIALIS TENDINITIS OF LEFT LOWER EXTREMITY: Primary | ICD-10-CM

## 2024-02-26 PROCEDURE — 99213 OFFICE O/P EST LOW 20 MIN: CPT | Performed by: ORTHOPAEDIC SURGERY

## 2024-02-26 NOTE — TELEPHONE ENCOUNTER
Spoke to patient and changed the work note from 04/04/2024 to 03/04/2024 [facility]

## 2024-02-26 NOTE — TELEPHONE ENCOUNTER
She was just seen but the wrong month is on her work note. She needs this as soon as possible. She is there at work to turn it in but it's incorrect. Please call.

## 2024-02-26 NOTE — PROGRESS NOTES
Name: Catalina Morrison  YOB: 1985  Gender: female  MRN: [id number]    Summary:   Left ring ligament reconstruction       CC: Follow-up (Left ankle)       HPI: Catalina Morrison is a 39 y.o. female who presents with Follow-up (Left ankle)  .  This patient presents back to the office today with continued complaints of pain located in her left foot.  She had an [place] brace now for about a week and a half and is still trying to get used to it and adjust.    History was obtained by Patient     ROS/Meds/PSH/PMH/FH/SH: I personally reviewed the patients standard intake form.  Below are the pertinents    Tobacco:  reports that she has never smoked. She has never used smokeless tobacco.  Diabetes: None      Physical Examination:  Exam of the left foot shows well-healed surgical incision with tenderness over the medial foot.  There is no sign of infection or RSD noted.      Imaging:   No imaging reviewed           ARELI EPSTEIN III, MD           Assessment:   Left spring ligament reconstruction    Treatment Plan:   3 This is stable chronic illness/condition  Treatment at this time: [place] Brace  Studies ordered: NO XR needed @ Next Visit    Weight-bearing status: WBAT        Return to work/work restrictions:  She will resume work on March 4.  Prior to this she has been out of work secondary to pain and inability to weight-bear on the foot.  The work restrictions for the next 3 months are as follows: Able to wear accommodative shoes as needed for the brace, no more than 2 hours standing per time on her foot with a 20-minute break every 2 hours

## 2024-03-13 ENCOUNTER — OFFICE VISIT (OUTPATIENT)
Dept: INTERNAL MEDICINE CLINIC | Facility: CLINIC | Age: 39
End: 2024-03-13
Payer: COMMERCIAL

## 2024-03-13 ENCOUNTER — TELEPHONE (OUTPATIENT)
Dept: ORTHOPEDIC SURGERY | Age: 39
End: 2024-03-13

## 2024-03-13 VITALS
SYSTOLIC BLOOD PRESSURE: 131 MMHG | TEMPERATURE: 98.2 F | WEIGHT: 280.2 LBS | HEART RATE: 91 BPM | DIASTOLIC BLOOD PRESSURE: 78 MMHG | HEIGHT: 65 IN | OXYGEN SATURATION: 99 % | BODY MASS INDEX: 46.69 KG/M2

## 2024-03-13 DIAGNOSIS — J30.9 ALLERGIC RHINITIS, UNSPECIFIED SEASONALITY, UNSPECIFIED TRIGGER: ICD-10-CM

## 2024-03-13 DIAGNOSIS — R06.09 DYSPNEA ON EXERTION: Primary | ICD-10-CM

## 2024-03-13 DIAGNOSIS — J04.0 LARYNGITIS: ICD-10-CM

## 2024-03-13 DIAGNOSIS — J45.909 ASTHMA, UNSPECIFIED ASTHMA SEVERITY, UNSPECIFIED WHETHER COMPLICATED, UNSPECIFIED WHETHER PERSISTENT: ICD-10-CM

## 2024-03-13 PROCEDURE — 99213 OFFICE O/P EST LOW 20 MIN: CPT | Performed by: NURSE PRACTITIONER

## 2024-03-13 RX ORDER — METHYLPREDNISOLONE 4 MG/1
TABLET ORAL
Qty: 1 KIT | Refills: 0 | Status: SHIPPED | OUTPATIENT
Start: 2024-03-13 | End: 2024-03-19

## 2024-03-13 ASSESSMENT — ENCOUNTER SYMPTOMS
RHINORRHEA: 1
DIARRHEA: 0
SINUS PAIN: 0
ABDOMINAL PAIN: 0
COUGH: 0
SORE THROAT: 0
SHORTNESS OF BREATH: 1
BACK PAIN: 0
VOMITING: 0
EYE PAIN: 0
NAUSEA: 0
CONSTIPATION: 0
VOICE CHANGE: 1

## 2024-03-13 NOTE — TELEPHONE ENCOUNTER
She started back to work full time on Monday wearing a brace. She is having a lot of pain when standing on it all day. She would like to know if he can give her something to help through out the day. She does use the [facility] pharmacy on [address]

## 2024-03-13 NOTE — TELEPHONE ENCOUNTER
Left patient a voicemail stating she would need to schedule a follow up appointment for first availiable with Dr. Chavez. We do not prescribe narcotics after 3mo post op. She could also follow up with her PCP as well.

## 2024-03-13 NOTE — PROGRESS NOTES
[facility]  [address]  [facility] [address]  Tel# [phone]  Fax# [phone]     Kinza Ko, MAGDA, FNP-BC  Family Nurse Practitioner            Date of Visit: 2024     Catalina Morrison (: 1985) is a 39 y.o. 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  She notes some SOB, but mostly, when she breaths in it feels like her lungs are on fire.  She notes she sat out in the cold for 10 hrs helping with [employer].  This began about 3 wks ago.          Patient Care Team:  nAton Best DO as PCP - General (Internal Medicine)  Anton Best DO as PCP - Empaneled Provider         History of Present Illness        Acute Visit  PCP: Dr. CHRISTINE Best        Dyspnea  Pt states she started feeling inflammation to her lungs, easily short of breath about 3 weeks ago.  Denies any fever or cough. States her asthma is usually triggered by certain perfumes. Denies asthma triggers recently.  Has tried OTC DayQuil/NyQuil. Denies ill exposure. Denies wheezing. Has been taking her Xyzal.  Has not needed to use her inhaler in the last 3 weeks.   States nasal spray gives her nose bleed.  Denies seeing a pulmonologist since a teenager            HCM      Immunization History   Administered Date(s) Administered    COVID-19, MODERNA BLUE border, Primary or Immunocompromised, (age 12y+), IM, 100 mcg/0.5mL 2021, 2021    TDaP, ADACEL (age 10y-64y), BOOSTRIX (age 10y+), IM, 0.5mL 08/10/2012        Social History     Substance and Sexual Activity   Alcohol Use Yes    Comment: occ        Tobacco Use: Low Risk  (3/13/2024)    Patient History     Smoking Tobacco Use: Never     Smokeless Tobacco Use: Never     Passive Exposure: Not on file        Patient Active Problem List   Diagnosis    Ureteral stone    Costochondritis

## 2024-03-27 ENCOUNTER — OFFICE VISIT (OUTPATIENT)
Dept: ORTHOPEDIC SURGERY | Age: 39
End: 2024-03-27
Payer: COMMERCIAL

## 2024-03-27 DIAGNOSIS — M76.822 TIBIALIS TENDINITIS OF LEFT LOWER EXTREMITY: Primary | ICD-10-CM

## 2024-03-27 PROCEDURE — 99214 OFFICE O/P EST MOD 30 MIN: CPT | Performed by: ORTHOPAEDIC SURGERY

## 2024-03-27 RX ORDER — GABAPENTIN 600 MG/1
600 TABLET ORAL DAILY
Qty: 30 TABLET | Refills: 2 | Status: SHIPPED | OUTPATIENT
Start: 2024-03-27 | End: 2024-04-26

## 2024-03-27 NOTE — PROGRESS NOTES
Name: Catalina Morrison  YOB: 1985  Gender: female  MRN: [id number]    Summary:     (Ligament reconstruction     CC: Follow-up (1. Left gastrocnemius recession/2. Left medial displacement calcaneal osteotomy/3. Left posterior tibial tendon debridement with removal of accessory navicular and transfer of flexor digitorum longus tendon transfer/4. Left spring ligament reconstruction of the ankle/5. Left medial cuneiform opening wedge osteotomy/DOS 4/13/23/Patient complains of still having pain when standing at work)       HPI: Catalina Morrison is a 39 y.o. female who presents with Follow-up (1. Left gastrocnemius recession/2. Left medial displacement calcaneal osteotomy/3. Left posterior tibial tendon debridement with removal of accessory navicular and transfer of flexor digitorum longus tendon transfer/4. Left spring ligament reconstruction of the ankle/5. Left medial cuneiform opening wedge osteotomy/DOS 4/13/23/Patient complains of still having pain when standing at work)  .  This patient presents by the office today with some improvements in her left spring ligament reconstruction pain.  She has responded somewhat well [place] on some of her mom's gabapentin.  She continues with [place] brace and is satisfied with her current work restrictions.    History was obtained by Patient     ROS/Meds/PSH/PMH/FH/SH: I personally reviewed the patients standard intake form.  Below are the pertinents    Tobacco:  reports that she has never smoked. She has never used smokeless tobacco.  Diabetes: None      Physical Examination:    Exam of the left lower extremity shows less swelling and tenderness palpation with well-healed surgical incisions.    Imaging:   No imaging reviewed           ARELI EPSTEIN III, MD           Assessment:   Left posterior tibial tendon spring ligament reconstruction    Treatment Plan:   4 This is a chronic illness/condition with exacerbation and progression  Treatment at this time:

## 2024-03-31 ENCOUNTER — HOSPITAL ENCOUNTER (EMERGENCY)
Age: 39
Discharge: HOME | End: 2024-03-31
Attending: EMERGENCY MEDICINE
Payer: COMMERCIAL

## 2024-03-31 VITALS
BODY MASS INDEX: 44.15 KG/M2 | WEIGHT: 265 LBS | TEMPERATURE: 98.2 F | HEART RATE: 67 BPM | SYSTOLIC BLOOD PRESSURE: 121 MMHG | OXYGEN SATURATION: 100 % | DIASTOLIC BLOOD PRESSURE: 95 MMHG | HEIGHT: 65 IN | RESPIRATION RATE: 18 BRPM

## 2024-03-31 DIAGNOSIS — R51.9 ACUTE NONINTRACTABLE HEADACHE, UNSPECIFIED HEADACHE TYPE: Primary | ICD-10-CM

## 2024-03-31 PROCEDURE — 96374 THER/PROPH/DIAG INJ IV PUSH: CPT

## 2024-03-31 PROCEDURE — 2580000003 HC RX 258: Performed by: EMERGENCY MEDICINE

## 2024-03-31 PROCEDURE — 6360000002 HC RX W HCPCS: Performed by: EMERGENCY MEDICINE

## 2024-03-31 PROCEDURE — 99284 EMERGENCY DEPT VISIT MOD MDM: CPT

## 2024-03-31 PROCEDURE — 96375 TX/PRO/DX INJ NEW DRUG ADDON: CPT

## 2024-03-31 RX ORDER — KETOROLAC TROMETHAMINE 30 MG/ML
30 INJECTION, SOLUTION INTRAMUSCULAR; INTRAVENOUS
Status: COMPLETED | OUTPATIENT
Start: 2024-03-31 | End: 2024-03-31

## 2024-03-31 RX ORDER — BUTALBITAL, ACETAMINOPHEN AND CAFFEINE 50; 325; 40 MG/1; MG/1; MG/1
1 TABLET ORAL EVERY 4 HOURS PRN
Qty: 20 TABLET | Refills: 0 | Status: SHIPPED | OUTPATIENT
Start: 2024-03-31

## 2024-03-31 RX ORDER — 0.9 % SODIUM CHLORIDE 0.9 %
1000 INTRAVENOUS SOLUTION INTRAVENOUS
Status: COMPLETED | OUTPATIENT
Start: 2024-03-31 | End: 2024-03-31

## 2024-03-31 RX ORDER — PROCHLORPERAZINE EDISYLATE 5 MG/ML
10 INJECTION INTRAMUSCULAR; INTRAVENOUS
Status: COMPLETED | OUTPATIENT
Start: 2024-03-31 | End: 2024-03-31

## 2024-03-31 RX ADMIN — SODIUM CHLORIDE 1000 ML: 9 INJECTION, SOLUTION INTRAVENOUS at 16:36

## 2024-03-31 RX ADMIN — PROCHLORPERAZINE EDISYLATE 10 MG: 5 INJECTION INTRAMUSCULAR; INTRAVENOUS at 16:41

## 2024-03-31 RX ADMIN — KETOROLAC TROMETHAMINE 30 MG: 30 INJECTION INTRAMUSCULAR; INTRAVENOUS at 16:41

## 2024-03-31 ASSESSMENT — PAIN - FUNCTIONAL ASSESSMENT: PAIN_FUNCTIONAL_ASSESSMENT: 0-10

## 2024-03-31 ASSESSMENT — PAIN DESCRIPTION - LOCATION: LOCATION: HEAD

## 2024-03-31 ASSESSMENT — PAIN SCALES - GENERAL: PAINLEVEL_OUTOF10: 5

## 2024-03-31 NOTE — ED TRIAGE NOTES
Patient reports of migraine and sensitivity to light. Patient reports pain 9/10. Patient took ibuprofen an hour ago. Blurry vision.     Hx: migraines x 25 years

## 2024-03-31 NOTE — ED NOTES
I have reviewed discharge instructions with the patient.  The patient verbalized understanding.    Patient left ED via Discharge Method: ambulatory to Home with family.    Opportunity for questions and clarification provided.       Patient given 1 scripts.         To continue your aftercare when you leave the hospital, you may receive an automated call from our care team to check in on how you are doing.  This is a free service and part of our promise to provide the best care and service to meet your aftercare needs.” If you have questions, or wish to unsubscribe from this service please call [phone].  Thank you for Choosing our [facility] Emergency Department.        Lilian Weller LPN  03/31/24 8536

## 2024-03-31 NOTE — ED PROVIDER NOTES
Emergency Department Provider Note       PCP: Anton Best DO   Age: 39 y.o.   Sex: female     DISPOSITION Decision To Discharge 03/31/2024 05:24:22 PM       ICD-10-CM    1. Acute nonintractable headache, unspecified headache type  R51.9           Medical Decision Making     Patient with headache.  Feeling better here.  History of migraines.  Not on any migraine medication.  Will discharge with Fioricet and refer to neurology for further treatment.     1 or more acute illnesses that pose a threat to life or bodily function.   Prescription drug management performed.  Patient was discharged risks and benefits of hospitalization were considered.  Shared medical decision making was utilized in creating the patients health plan today.    I independently ordered and reviewed each unique test.                     History     Pt with a history of migraines. Had right wisdom tooth pulled Monday. Since HA have been worse. Worse today while at work. Some dizziness. Some nausea. Light sensitivity. Takes motrin only. Trying to see a neurologist for this.     The history is provided by the patient. No  was used.   Headache  Pain location:  Frontal and occipital  Quality:  Dull  Radiates to:  L neck and R neck  Duration:  1 day  Timing:  Constant  Progression:  Worsening  Chronicity:  New  Similar to prior headaches: yes    Relieved by:  Nothing  Worsened by:  Light  Associated symptoms: blurred vision, dizziness, nausea and photophobia    Associated symptoms: no abdominal pain, no back pain, no cough, no diarrhea, no fatigue, no fever, no focal weakness, no myalgias, no neck stiffness, no numbness, no sore throat, no vomiting and no weakness        ROS     Review of Systems   Constitutional:  Negative for chills, fatigue and fever.   HENT:  Negative for rhinorrhea and sore throat.    Eyes:  Positive for blurred vision and photophobia.   Respiratory:  Negative for cough and shortness of breath.

## 2024-04-15 ENCOUNTER — OFFICE VISIT (OUTPATIENT)
Dept: INTERNAL MEDICINE CLINIC | Facility: CLINIC | Age: 39
End: 2024-04-15
Payer: COMMERCIAL

## 2024-04-15 VITALS
HEIGHT: 65 IN | SYSTOLIC BLOOD PRESSURE: 123 MMHG | OXYGEN SATURATION: 98 % | TEMPERATURE: 98.1 F | DIASTOLIC BLOOD PRESSURE: 91 MMHG | WEIGHT: 279 LBS | HEART RATE: 84 BPM | BODY MASS INDEX: 46.48 KG/M2

## 2024-04-15 DIAGNOSIS — J45.41 MODERATE PERSISTENT ASTHMA WITH ACUTE EXACERBATION: ICD-10-CM

## 2024-04-15 DIAGNOSIS — F41.9 ANXIETY: ICD-10-CM

## 2024-04-15 DIAGNOSIS — M26.622 ARTHRALGIA OF LEFT TEMPOROMANDIBULAR JOINT: ICD-10-CM

## 2024-04-15 DIAGNOSIS — G43.809 VESTIBULAR MIGRAINE: Primary | ICD-10-CM

## 2024-04-15 PROCEDURE — 99214 OFFICE O/P EST MOD 30 MIN: CPT | Performed by: INTERNAL MEDICINE

## 2024-04-15 RX ORDER — BUSPIRONE HYDROCHLORIDE 5 MG/1
5 TABLET ORAL 3 TIMES DAILY PRN
Qty: 90 TABLET | Refills: 1 | Status: SHIPPED | OUTPATIENT
Start: 2024-04-15 | End: 2025-01-10

## 2024-04-15 ASSESSMENT — PATIENT HEALTH QUESTIONNAIRE - PHQ9
SUM OF ALL RESPONSES TO PHQ QUESTIONS 1-9: 0
SUM OF ALL RESPONSES TO PHQ9 QUESTIONS 1 & 2: 0
2. FEELING DOWN, DEPRESSED OR HOPELESS: NOT AT ALL
1. LITTLE INTEREST OR PLEASURE IN DOING THINGS: NOT AT ALL

## 2024-04-15 NOTE — TELEPHONE ENCOUNTER
Patient was under the impression that she was supposed to get a prescription for Buspar to help with her anxiety.  Chart was checked, she is correct.  Patient was told that she would be given a prescription  for Buspar 5 mg to take 2 tablets in the am and 1 at night prn - will discuss  the medication with her at her May 2nd follow up.  Rx pended

## 2024-04-15 NOTE — ASSESSMENT & PLAN NOTE
migraines present since the age of 14.  Patient was recently evaluated by the emergency department.  She was up and offered Fioricet for symptomatic relief which has been somewhat effective.  Referral to neurology offered at that time has not been completed.  Offering referral to neurology today.

## 2024-04-15 NOTE — ACP (ADVANCE CARE PLANNING)
Advance Care Planning   The patient has the following advanced directives on file:  Advance Directives       Power of  Living Will ACP-Advance Directive ACP-Power of     Not on File Not on File Not on File Not on File            The patient has appointed the following active healthcare agents:    Primary Decision Maker: RUDI NEUMANN - Spouse - [phone]    Secondary Decision Maker: KATT JOSHUA - Parent - [phone]    The Patient has the following current code status:    Code Status: Prior    Visit Documentation:  I discussed Advance Care Planning with Catalina Neumann today which included the importance of making their choices for care and treatment in the case of a health event that adversely affects their decision-making abilities. She has not completed the Advance Care Directives. She does not have an active health care agent at this time.  Catalina Neumann was encouraged to complete the declaration forms and provide a signed copy of her medical records. I advised patient we would continue this discussion at future visits.     Rita Moss  4/15/2024

## 2024-04-15 NOTE — PROGRESS NOTES
5 mg 3 times daily as needed.  Offering ibuprofen 400 mg to be used nightly for 7 to 14 days.  Recent extraction may be exacerbating symptoms.  Offering muscle energy stretching techniques to be utilized at least 10 times prior to bed nightly.  Patient will return in May to this office for follow-up.  Will discuss medications added at this time which include ibuprofen for anti-inflammatory effect as well as BuSpar.  Recommending BuSpar to be used twice daily with 5 mg in the morning and potentially 10 mg in the evening.  Patient with previous allergy to Zanaflex.  Social History     Tobacco Use    Smoking status: Never    Smokeless tobacco: Never   Vaping Use    Vaping Use: Never used   Substance Use Topics    Alcohol use: Yes     Comment: occ    Drug use: No     Vitals:    04/15/24 0801   BP: (!) 123/91   Site: Left Upper Arm   Position: Sitting   Cuff Size: Large Adult   Pulse: 84   Temp: 98.1 °F (36.7 °C)   SpO2: 98%   Weight: 126.6 kg (279 lb)   Height: 1.651 m (5' 5\")      Body mass index is 46.43 kg/m².  Physical Exam  Vitals and nursing note reviewed.   Constitutional:       Appearance: Normal appearance.   HENT:      Head: Normocephalic.      Comments: Asymmetric movement of mandible. L>R     Nose: No congestion.   Eyes:      Extraocular Movements: Extraocular movements intact.   Cardiovascular:      Rate and Rhythm: Normal rate and regular rhythm.   Skin:     General: Skin is warm and dry.   Neurological:      Mental Status: She is alert.     Provider spent 30 minutes with patient, reviewing chart, records, labs, notes, and in face-to-face discussion.     An electronic signature was used to authenticate this note.  Anton Best DO

## 2024-04-15 NOTE — ASSESSMENT & PLAN NOTE
Offering BuSpar 5 mg 3 times daily as needed.  Patient to utilize 5 mg in the morning as well as 5 mg in the evening for TMJ.  Patient with a previous allergy to Zanaflex.

## 2024-04-23 DIAGNOSIS — G43.809 VESTIBULAR MIGRAINE: Primary | ICD-10-CM

## 2024-04-23 RX ORDER — BUTALBITAL, ACETAMINOPHEN AND CAFFEINE 50; 325; 40 MG/1; MG/1; MG/1
1 TABLET ORAL EVERY 4 HOURS PRN
Qty: 20 TABLET | Refills: 0 | Status: SHIPPED | OUTPATIENT
Start: 2024-04-23

## 2024-04-23 NOTE — TELEPHONE ENCOUNTER
----- Message from Catalina Morrison sent at 4/23/2024 12:40 PM EDT -----  Regarding: Migraine  Contact: [phone]  Is there a way to get refills for my fiorecet? There are none on the bottle and I won't have enough to make it till the 7th

## 2024-04-25 ENCOUNTER — PATIENT MESSAGE (OUTPATIENT)
Dept: ORTHOPEDIC SURGERY | Age: 39
End: 2024-04-25

## 2024-04-25 NOTE — TELEPHONE ENCOUNTER
From: Catalina Morrison  To: Dr. Areli Chavez  Sent: 4/24/2024 10:39 PM EDT  Subject: Appointment Request    Appointment Request From: Catalina Morrison    With Provider: ARELI CHAVEZ III, MD [[facility]]    Preferred Date Range: Any    Preferred Times: Any Time    Reason for visit: Request an Appointment    Comments:  Ingrown toenail

## 2024-04-27 ENCOUNTER — APPOINTMENT (OUTPATIENT)
Dept: GENERAL RADIOLOGY | Age: 39
End: 2024-04-27
Payer: COMMERCIAL

## 2024-04-27 ENCOUNTER — HOSPITAL ENCOUNTER (EMERGENCY)
Age: 39
Discharge: HOME | End: 2024-04-27
Attending: EMERGENCY MEDICINE
Payer: COMMERCIAL

## 2024-04-27 VITALS
WEIGHT: 270 LBS | SYSTOLIC BLOOD PRESSURE: 138 MMHG | RESPIRATION RATE: 14 BRPM | TEMPERATURE: 97.7 F | BODY MASS INDEX: 44.98 KG/M2 | DIASTOLIC BLOOD PRESSURE: 95 MMHG | HEART RATE: 73 BPM | HEIGHT: 65 IN | OXYGEN SATURATION: 97 %

## 2024-04-27 DIAGNOSIS — R07.89 CHEST TIGHTNESS: Primary | ICD-10-CM

## 2024-04-27 DIAGNOSIS — M25.519 PAIN OF SHOULDER GIRDLE: ICD-10-CM

## 2024-04-27 LAB
ALBUMIN SERPL-MCNC: 3.7 G/DL (ref 3.5–5)
ALBUMIN/GLOB SERPL: 1.1 (ref 1–1.9)
ALP SERPL-CCNC: 131 U/L (ref 35–104)
ALT SERPL-CCNC: 19 U/L (ref 12–65)
ANION GAP SERPL CALC-SCNC: 10 MMOL/L (ref 9–18)
AST SERPL-CCNC: 25 U/L (ref 15–37)
BILIRUB SERPL-MCNC: <0.2 MG/DL (ref 0–1.2)
BUN SERPL-MCNC: 9 MG/DL (ref 6–23)
CALCIUM SERPL-MCNC: 8.8 MG/DL (ref 8.8–10.2)
CHLORIDE SERPL-SCNC: 107 MMOL/L (ref 98–107)
CO2 SERPL-SCNC: 23 MMOL/L (ref 20–28)
CREAT SERPL-MCNC: 0.77 MG/DL (ref 0.6–1.1)
D DIMER PPP FEU-MCNC: 0.29 UG/ML(FEU)
ERYTHROCYTE [DISTWIDTH] IN BLOOD BY AUTOMATED COUNT: 13.4 % (ref 11.9–14.6)
GLOBULIN SER CALC-MCNC: 3.5 G/DL (ref 2.3–3.5)
GLUCOSE SERPL-MCNC: 125 MG/DL (ref 70–99)
HCT VFR BLD AUTO: 41.9 % (ref 35.8–46.3)
HGB BLD-MCNC: 13.3 G/DL (ref 11.7–15.4)
LIPASE SERPL-CCNC: 30 U/L (ref 13–60)
MCH RBC QN AUTO: 28.2 PG (ref 26.1–32.9)
MCHC RBC AUTO-ENTMCNC: 31.7 G/DL (ref 31.4–35)
MCV RBC AUTO: 89 FL (ref 82–102)
NRBC # BLD: 0 K/UL (ref 0–0.2)
PLATELET # BLD AUTO: 376 K/UL (ref 150–450)
PMV BLD AUTO: 9 FL (ref 9.4–12.3)
POTASSIUM SERPL-SCNC: 4 MMOL/L (ref 3.5–5.1)
PROT SERPL-MCNC: 7.1 G/DL (ref 6.3–8.2)
RBC # BLD AUTO: 4.71 M/UL (ref 4.05–5.2)
SODIUM SERPL-SCNC: 140 MMOL/L (ref 136–145)
TROPONIN T SERPL HS-MCNC: <6 NG/L (ref 0–14)
TROPONIN T SERPL HS-MCNC: <6 NG/L (ref 0–14)
WBC # BLD AUTO: 10 K/UL (ref 4.3–11.1)

## 2024-04-27 PROCEDURE — 6360000002 HC RX W HCPCS: Performed by: EMERGENCY MEDICINE

## 2024-04-27 PROCEDURE — 80053 COMPREHEN METABOLIC PANEL: CPT

## 2024-04-27 PROCEDURE — 85027 COMPLETE CBC AUTOMATED: CPT

## 2024-04-27 PROCEDURE — 84484 ASSAY OF TROPONIN QUANT: CPT

## 2024-04-27 PROCEDURE — 71046 X-RAY EXAM CHEST 2 VIEWS: CPT

## 2024-04-27 PROCEDURE — 96374 THER/PROPH/DIAG INJ IV PUSH: CPT

## 2024-04-27 PROCEDURE — 83690 ASSAY OF LIPASE: CPT

## 2024-04-27 PROCEDURE — 99284 EMERGENCY DEPT VISIT MOD MDM: CPT

## 2024-04-27 PROCEDURE — 96375 TX/PRO/DX INJ NEW DRUG ADDON: CPT

## 2024-04-27 PROCEDURE — 85379 FIBRIN DEGRADATION QUANT: CPT

## 2024-04-27 PROCEDURE — 73030 X-RAY EXAM OF SHOULDER: CPT

## 2024-04-27 RX ORDER — DEXAMETHASONE SODIUM PHOSPHATE 10 MG/ML
10 INJECTION INTRAMUSCULAR; INTRAVENOUS
Status: COMPLETED | OUTPATIENT
Start: 2024-04-27 | End: 2024-04-27

## 2024-04-27 RX ORDER — KETOROLAC TROMETHAMINE 15 MG/ML
15 INJECTION, SOLUTION INTRAMUSCULAR; INTRAVENOUS
Status: COMPLETED | OUTPATIENT
Start: 2024-04-27 | End: 2024-04-27

## 2024-04-27 RX ADMIN — KETOROLAC TROMETHAMINE 15 MG: 15 INJECTION, SOLUTION INTRAMUSCULAR; INTRAVENOUS at 14:10

## 2024-04-27 RX ADMIN — DEXAMETHASONE SODIUM PHOSPHATE 10 MG: 10 INJECTION INTRAMUSCULAR; INTRAVENOUS at 14:10

## 2024-04-27 ASSESSMENT — HEART SCORE
ECG: NON-SPECIFC REPOLARIZATION DISTURBANCE/LBTB/PM
ECG: NON-SPECIFC REPOLARIZATION DISTURBANCE/LBTB/PM

## 2024-04-27 ASSESSMENT — PAIN SCALES - GENERAL
PAINLEVEL_OUTOF10: 5
PAINLEVEL_OUTOF10: 9
PAINLEVEL_OUTOF10: 7

## 2024-04-27 ASSESSMENT — ENCOUNTER SYMPTOMS
VOMITING: 0
ABDOMINAL PAIN: 0
BACK PAIN: 0
COUGH: 0
SHORTNESS OF BREATH: 1
RHINORRHEA: 0
NAUSEA: 0
DIARRHEA: 0
COLOR CHANGE: 0

## 2024-04-27 ASSESSMENT — PAIN DESCRIPTION - LOCATION
LOCATION: CHEST

## 2024-04-27 ASSESSMENT — PAIN DESCRIPTION - DESCRIPTORS
DESCRIPTORS: TIGHTNESS
DESCRIPTORS: TIGHTNESS;THROBBING

## 2024-04-27 ASSESSMENT — LIFESTYLE VARIABLES
HOW OFTEN DO YOU HAVE A DRINK CONTAINING ALCOHOL: NEVER
HOW MANY STANDARD DRINKS CONTAINING ALCOHOL DO YOU HAVE ON A TYPICAL DAY: PATIENT DOES NOT DRINK

## 2024-04-27 ASSESSMENT — PAIN - FUNCTIONAL ASSESSMENT: PAIN_FUNCTIONAL_ASSESSMENT: 0-10

## 2024-04-27 NOTE — ED TRIAGE NOTES
Pt to ED with c/o midsternal chest pain that began yesterday as a tightness felt like she was not getting enough chest expansion so she took her inhaler with some relief. Pt states waking up at  this am with the pain spreading into her left shoulder/breast/arm. States no nv. Pt states hx of vertigo, migraines, asthma, and anxiety (started on new meds). Pt denies any cardiac hx at this time. Pt states taking gabapentin this AM with no relief.

## 2024-04-27 NOTE — ED NOTES
Patient mobility status  with no difficulty. Provider aware     I have reviewed discharge instructions with the patient.  The patient verbalized understanding.    Patient left ED via Discharge Method: ambulatory to Home with  family .    Opportunity for questions and clarification provided.     Patient given 0 scripts.            Mari, Ashleigh, RN  04/27/24 5755

## 2024-04-27 NOTE — DISCHARGE INSTRUCTIONS
Over-the-counter ibuprofen 800 mg 3 times a day for 4 to 5 days.  Tylenol 500 to 650 mg every 6 hours for breakthrough pain.  Steroid provided today should help calm inflammation in these muscles down over the next 48 hours and help as well.  Some ice to the sorest areas for 15 minutes every 4 hours while awake for 3 to 5 days is recommended.  Follow-up with your doctor in 7 to 10 days if not significantly improved.  Use your albuterol 2 puffs every 4-6 hours as needed for chest tightness or wheezing if it is helpful.  Return if any new, worsening or concerning symptoms

## 2024-04-27 NOTE — ED PROVIDER NOTES
Emergency Department Provider Note       PCP: Anton Best DO   Age: 39 y.o.   Sex: female     DISPOSITION Decision To Discharge 04/27/2024 01:47:56 PM       ICD-10-CM    1. Chest tightness  R07.89       2. Pain of shoulder girdle  M25.519           Medical Decision Making     EKG is nonspecific with lateral and inferior ST-T changes-no significant changes compared to previous EKG.  Will rule out MI with serial troponins and get a D-dimer to help exclude PE.  PERC criteria are equivocal given chronic left lower extremity swelling and patient noted to have borderline tachycardia with a heart rate of 92.  Chest tightness likely related to her asthma and/or anxiety and left shoulder pain neck pain and arm pain seem to clearly be musculoskeletal    1:51 PM  D-dimer is normal troponin is normal, chest x-ray and shoulder x-rays are normal.  Labs are otherwise unremarkable.  Heart score is 1.  Second troponin not indicated given duration of chest tightness onset yesterday.  Patient safe and appropriate for discharge.     1 or more acute illnesses that pose a threat to life or bodily function.   1 chronic illness with exacerbation.  Over the counter drug management performed.  Shared medical decision making was utilized in creating the patients health plan today.    I independently ordered and reviewed each unique test.  I reviewed external records: provider visit note from PCP.  I reviewed external records: previous EKG including cardiologist interpretation.       I interpreted the X-rays x-ray of the left shoulder and chest are both unremarkable with no acute process.  My Independent EKG Interpretation: sinus rhythm, no evidence of arrhythmia      ST Segments:Nonspecific ST segments - NO STEMI   Rate: 91, left anterior fascicular block late transition            History     39-year-old female with history of asthma and migraine headaches presents with chest tightness onset yesterday, slightly relieved with albuterol   General: There is no distension.      Palpations: Abdomen is soft.      Tenderness: There is no abdominal tenderness. There is no guarding or rebound.   Musculoskeletal:         General: Tenderness present.      Cervical back: Neck supple. Tenderness (Tenderness left cervical paraspinous muscles and left trapezius muscle) present.      Comments: Limited range of motion in the left shoulder, pain worse with active range of motion more so than passive, neurovascularly intact left upper   Skin:     General: Skin is warm and dry.   Neurological:      Mental Status: She is alert and oriented to person, place, and time.   Psychiatric:         Behavior: Behavior normal.        Procedures     Procedures    Orders Placed This Encounter   Procedures    XR CHEST (2 VW)    XR SHOULDER LEFT (MIN 2 VIEWS)    CBC    Comprehensive Metabolic Panel    Lipase    Troponin    D-Dimer, Quantitative    Troponin    Insert peripheral IV        Medications given during this emergency department visit:  Medications   dexAMETHasone (DECADRON) injection 10 mg (has no administration in time range)   ketorolac (TORADOL) injection 15 mg (has no administration in time range)       New Prescriptions    No medications on file        Past Medical History:   Diagnosis Date    ADHD (attention deficit hyperactivity disorder)     Anxiety     Asthma     prn inhaler--    Chronic back pain     Depression     History of kidney stones     x 3-- none in several yrs per pt    Left foot pain     Migraines     Morbid obesity (HCC)     BMI 43    Unspecified sleep apnea     does not wear cpap at hs    Vertigo     r/t migraines        Past Surgical History:   Procedure Laterality Date    ANKLE SURGERY Left 04/13/2023    left spring ligament reconstruction performed by Wisam Chavez III, MD at [facility]    FOOT OSTEOTOMY Left 04/13/2023    medial calcaneal slide osteotomy performed by Wisam Chavez III, MD at [facility]    FOOT TENDON SURGERY Left 04/13/2023    flexor

## 2024-04-30 ENCOUNTER — OFFICE VISIT (OUTPATIENT)
Dept: OBGYN CLINIC | Age: 39
End: 2024-04-30
Payer: COMMERCIAL

## 2024-04-30 VITALS
BODY MASS INDEX: 45.48 KG/M2 | WEIGHT: 273 LBS | DIASTOLIC BLOOD PRESSURE: 84 MMHG | SYSTOLIC BLOOD PRESSURE: 136 MMHG | HEIGHT: 65 IN

## 2024-04-30 DIAGNOSIS — L98.9 SKIN LESION: ICD-10-CM

## 2024-04-30 DIAGNOSIS — Z12.4 SCREENING FOR CERVICAL CANCER: ICD-10-CM

## 2024-04-30 DIAGNOSIS — Z01.419 WELL WOMAN EXAM WITH ROUTINE GYNECOLOGICAL EXAM: Primary | ICD-10-CM

## 2024-04-30 DIAGNOSIS — Z11.51 SCREENING FOR HUMAN PAPILLOMAVIRUS (HPV): ICD-10-CM

## 2024-04-30 PROCEDURE — 99395 PREV VISIT EST AGE 18-39: CPT | Performed by: OBSTETRICS & GYNECOLOGY

## 2024-04-30 PROCEDURE — 99214 OFFICE O/P EST MOD 30 MIN: CPT | Performed by: OBSTETRICS & GYNECOLOGY

## 2024-04-30 NOTE — PROGRESS NOTES
Catalina  is a 39 y.o.   who is here for an annual exam.      History  Past Medical History:   Diagnosis Date    ADHD (attention deficit hyperactivity disorder)     Anxiety     Asthma     prn inhaler--    Chronic back pain     Depression     History of kidney stones     x 3-- none in several yrs per pt    Left foot pain     Migraines     Morbid obesity (HCC)     BMI 43    Unspecified sleep apnea     does not wear cpap at hs    Vertigo     r/t migraines    ON FILE  Past Surgical History:   Procedure Laterality Date    ANKLE SURGERY Left 2023    left spring ligament reconstruction performed by Wisam Chavez III, MD at [facility]    FOOT OSTEOTOMY Left 2023    medial calcaneal slide osteotomy performed by Wisam Chavez III, MD at [facility]    FOOT TENDON SURGERY Left 2023    flexor digitorum longus tendon transfer performed by Wisam Chavez III, MD at [facility]    FRACTURE SURGERY      GASTROCNEMIUS RECESSION Left 2023    gastrocnemius recession performed by Wisam Chavez III, MD at [facility]    GASTROCNEMIUS RECESSION Left 2023    cotton osteotomy performed by Wisam Chavez III, MD at [facility]    HEENT      tonsil--as teen    LITHOTRIPSY Left 2015    OSTEOTOMY Left 2023    posterior tibial tendon reconstruction performed by Wisam Chavez III, MD at [facility]    TONSILLECTOMY      PRESENT IN FILE  Current Outpatient Medications on File Prior to Visit   Medication Sig Dispense Refill    butalbital-acetaminophen-caffeine (FIORICET, ESGIC) -40 MG per tablet Take 1 tablet by mouth every 4 hours as needed for Headaches 20 tablet 0    busPIRone (BUSPAR) 5 MG tablet Take 1 tablet by mouth 3 times daily as needed (anxiety) Take 2 tablets by mouth in the morning and 1 tablet in the evening as needed 90 tablet 1    meclizine (ANTIVERT) 25 MG tablet Take 1 tablet by mouth 4 times daily 120 tablet 3    levocetirizine (XYZAL) 5 MG tablet Take 1 tablet by mouth nightly 30 tablet 3    fluocinolone

## 2024-05-01 ENCOUNTER — OFFICE VISIT (OUTPATIENT)
Dept: INTERNAL MEDICINE CLINIC | Facility: CLINIC | Age: 39
End: 2024-05-01
Payer: COMMERCIAL

## 2024-05-01 VITALS
SYSTOLIC BLOOD PRESSURE: 126 MMHG | WEIGHT: 270 LBS | RESPIRATION RATE: 16 BRPM | HEIGHT: 65 IN | BODY MASS INDEX: 44.98 KG/M2 | DIASTOLIC BLOOD PRESSURE: 87 MMHG | OXYGEN SATURATION: 97 % | HEART RATE: 95 BPM

## 2024-05-01 DIAGNOSIS — R19.7 DIARRHEA, UNSPECIFIED TYPE: Primary | ICD-10-CM

## 2024-05-01 DIAGNOSIS — U07.1 COVID-19: ICD-10-CM

## 2024-05-01 PROCEDURE — 99214 OFFICE O/P EST MOD 30 MIN: CPT | Performed by: INTERNAL MEDICINE

## 2024-05-01 RX ORDER — DIPHENOXYLATE HYDROCHLORIDE AND ATROPINE SULFATE 2.5; .025 MG/1; MG/1
1 TABLET ORAL 4 TIMES DAILY PRN
Qty: 28 TABLET | Refills: 0 | Status: SHIPPED | OUTPATIENT
Start: 2024-05-01 | End: 2024-05-08

## 2024-05-01 RX ORDER — ALBUTEROL SULFATE 90 UG/1
2 AEROSOL, METERED RESPIRATORY (INHALATION) EVERY 4 HOURS PRN
Qty: 6 EACH | Refills: 5 | Status: SHIPPED | OUTPATIENT
Start: 2024-05-01 | End: 2024-10-28

## 2024-05-01 NOTE — PROGRESS NOTES
Catalina Morrison (: 1985) is a 39 y.o. female, here for evaluation of the following chief complaint(s):  Abdominal Pain and Diarrhea (Patient states she has had abdominal pain and diarrhea over a week.)       ASSESSMENT/PLAN:  1. Diarrhea, unspecified type  -     diphenoxylate-atropine (LOMOTIL) 2.5-0.025 MG per tablet; Take 1 tablet by mouth 4 times daily as needed for Diarrhea for up to 7 days. Max Daily Amount: 4 tablets, Disp-28 tablet, R-0Normal  2. COVID-19  -     albuterol sulfate HFA (PROVENTIL;VENTOLIN;PROAIR) 108 (90 Base) MCG/ACT inhaler; Inhale 2 puffs into the lungs every 4 hours as needed for Wheezing or Shortness of Breath, Disp-6 each, R-5Normal             SUBJECTIVE/OBJECTIVE:  HPI: Patient is a 39-year-old woman with complex medical history presenting for an acute care visit regarding abdominal pain and diarrhea present for the past week.  Patient states she is lost 4 pounds over the same period.  Patient recently ate several rolls from store-bought purchase that was in date though upon later inspection contained mold.  Since that time patient has had significant amounts of diarrhea that has kept her from working without requiring significant restroom variances.  As a consequence, patient has refrain from working periods recently.  Offering Lomotil for up to a week.  Patient has been to the emergency department since last seen in office for chest pain.  Patient was provided albuterol inhaler to good effect at that time.  No refills were offered at that time.  Offering refills at this time.  Social History     Tobacco Use    Smoking status: Some Days     Types: Cigars    Smokeless tobacco: Never   Vaping Use    Vaping Use: Never used   Substance Use Topics    Alcohol use: Yes     Comment: occ    Drug use: No     Vitals:    24 1417   BP: 126/87   Site: Left Upper Arm   Position: Sitting   Cuff Size: Large Adult   Pulse: 95   Resp: 16   SpO2: 97%   Weight: 122.5 kg (270 lb)   Height:

## 2024-05-02 ENCOUNTER — OFFICE VISIT (OUTPATIENT)
Dept: ENT CLINIC | Age: 39
End: 2024-05-02
Payer: COMMERCIAL

## 2024-05-02 ENCOUNTER — OFFICE VISIT (OUTPATIENT)
Dept: AUDIOLOGY | Age: 39
End: 2024-05-02
Payer: COMMERCIAL

## 2024-05-02 VITALS
DIASTOLIC BLOOD PRESSURE: 88 MMHG | HEIGHT: 65 IN | SYSTOLIC BLOOD PRESSURE: 126 MMHG | BODY MASS INDEX: 44.98 KG/M2 | WEIGHT: 270 LBS

## 2024-05-02 DIAGNOSIS — M26.609 TMJ (TEMPOROMANDIBULAR JOINT DISORDER): Primary | Chronic | ICD-10-CM

## 2024-05-02 DIAGNOSIS — G43.711 INTRACTABLE CHRONIC MIGRAINE WITHOUT AURA AND WITH STATUS MIGRAINOSUS: Chronic | ICD-10-CM

## 2024-05-02 DIAGNOSIS — H93.293 ABNORMAL AUDITORY PERCEPTION OF BOTH EARS: Primary | ICD-10-CM

## 2024-05-02 PROCEDURE — 99203 OFFICE O/P NEW LOW 30 MIN: CPT | Performed by: PHYSICIAN ASSISTANT

## 2024-05-02 PROCEDURE — 92557 COMPREHENSIVE HEARING TEST: CPT | Performed by: AUDIOLOGIST

## 2024-05-02 PROCEDURE — 92567 TYMPANOMETRY: CPT | Performed by: AUDIOLOGIST

## 2024-05-02 ASSESSMENT — ENCOUNTER SYMPTOMS
GASTROINTESTINAL NEGATIVE: 1
ALLERGIC/IMMUNOLOGIC NEGATIVE: 1
RESPIRATORY NEGATIVE: 1
EYES NEGATIVE: 1

## 2024-05-02 NOTE — PROGRESS NOTES
Catalina Morrison is a 39 y.o. female presents today with c/o hearing check and TMJ and vertigo.    The patient noticed hearing loss after she got water in her ear and for like she could not hear for about 6 weeks.  Its now cleared out and she feels better but wants to make sure that her hearing is okay.    Audiogram:     Left ear: Normal hearing across all frequencies  Right ear:Normal hearing across all frequencies  Discrimination score: Right ear 100 % and Left ear 100 %   Tympanogram: Right ear Type Ad and Left ear Type Ad.     She has a chronic problem with TMJ left side greater than right.  She sometimes will get so 10 she cannot open her jaw all the way she has cracked multiple teeth with grinding over the years.  She does not sleep with a mouthguard.  She has not seen oral surgery or her dentist regarding this.  She has had teeth pulled in the past.    Patient also has vertigo that are associated with her migraines.  She has daily migraines and if she lays down she will feel the room spinning but if she is standing up it feels like she is on a boat.  She has never had vertigo when she does not have a migraine.  She physically sways back-and-forth.  Several years ago she saw neurology but did not have a good experience.  Her primary care referred her to check back in with them again.  She \"eats Tylenol \" for management and has something stronger when really bad.  No VT.       Chief Complaint   Patient presents with    New Patient    Hearing Problem     Alteration in hearing, cerumen on TM.  Cleaned her ears at home and her ears became more clogged and affected her hearing.  States she would also like to go over TMJ and vertigo.  Has had issues with vertigo since 14.  States it is connected with her migraine.  States that the episodes are more like room spinning.  On days when she doesn't have a migraine, she doesn't have vertigo.  Some days the vertigo is worse than other.         Patient Active Problem List

## 2024-05-02 NOTE — PROGRESS NOTES
AUDIOLOGY EVALUATION    Catalina Morrison had Tympanometry and Audiometry performed today.    The patient reports hearing loss.     Results as follows:    Tympanometry    Type Ad -  bilaterally    Audiometry    Test Performed - Comprehensive Audiogram    Type of Loss - Right Ear: normal hearing                          Left Ear: normal hearing    SRT   Measurement Right Ear Left Ear   Value 10 10   Unit dB dB     Discrimination  Measurement Right Ear Left Ear   Value 100% 100%   Unit dB dB     Recommend  Retest as clinically indicated    Jerry Bucio [facility]  Audiologist

## 2024-05-06 ENCOUNTER — PATIENT MESSAGE (OUTPATIENT)
Dept: INTERNAL MEDICINE CLINIC | Facility: CLINIC | Age: 39
End: 2024-05-06

## 2024-05-07 ENCOUNTER — OFFICE VISIT (OUTPATIENT)
Dept: INTERNAL MEDICINE CLINIC | Facility: CLINIC | Age: 39
End: 2024-05-07
Payer: COMMERCIAL

## 2024-05-07 VITALS
HEART RATE: 93 BPM | OXYGEN SATURATION: 97 % | WEIGHT: 276.6 LBS | SYSTOLIC BLOOD PRESSURE: 121 MMHG | TEMPERATURE: 98.1 F | HEIGHT: 65 IN | DIASTOLIC BLOOD PRESSURE: 89 MMHG | BODY MASS INDEX: 46.08 KG/M2

## 2024-05-07 DIAGNOSIS — F41.9 ANXIETY: ICD-10-CM

## 2024-05-07 DIAGNOSIS — J45.41 MODERATE PERSISTENT ASTHMA WITH ACUTE EXACERBATION: Primary | ICD-10-CM

## 2024-05-07 DIAGNOSIS — R19.7 DIARRHEA OF PRESUMED INFECTIOUS ORIGIN: ICD-10-CM

## 2024-05-07 DIAGNOSIS — G43.809 VESTIBULAR MIGRAINE: ICD-10-CM

## 2024-05-07 LAB
COLLECTION METHOD: NORMAL
CYTOLOGIST CVX/VAG CYTO: NORMAL
CYTOLOGY CVX/VAG DOC THIN PREP: NORMAL
DATE OF LMP: NORMAL
HPV APTIMA: NEGATIVE
Lab: NORMAL
OTHER PT INFO: NORMAL
PAP SOURCE: NORMAL
PATH REPORT.FINAL DX SPEC: NORMAL
PREV CYTO INFO: NEGATIVE
PREV TREATMENT RESULTS: NORMAL
PREV TREATMENT: NORMAL
STAT OF ADQ CVX/VAG CYTO-IMP: NORMAL

## 2024-05-07 PROCEDURE — 99214 OFFICE O/P EST MOD 30 MIN: CPT | Performed by: INTERNAL MEDICINE

## 2024-05-07 RX ORDER — BUSPIRONE HYDROCHLORIDE 5 MG/1
5 TABLET ORAL 3 TIMES DAILY PRN
Qty: 90 TABLET | Refills: 3 | Status: SHIPPED | OUTPATIENT
Start: 2024-05-07 | End: 2025-02-01

## 2024-05-07 RX ORDER — METRONIDAZOLE 500 MG/1
500 TABLET ORAL 2 TIMES DAILY
Qty: 14 TABLET | Refills: 0 | Status: SHIPPED | OUTPATIENT
Start: 2024-05-07 | End: 2024-05-14

## 2024-05-07 ASSESSMENT — PATIENT HEALTH QUESTIONNAIRE - PHQ9
SUM OF ALL RESPONSES TO PHQ QUESTIONS 1-9: 0
2. FEELING DOWN, DEPRESSED OR HOPELESS: NOT AT ALL
SUM OF ALL RESPONSES TO PHQ9 QUESTIONS 1 & 2: 0
SUM OF ALL RESPONSES TO PHQ QUESTIONS 1-9: 0
SUM OF ALL RESPONSES TO PHQ QUESTIONS 1-9: 0
1. LITTLE INTEREST OR PLEASURE IN DOING THINGS: NOT AT ALL
SUM OF ALL RESPONSES TO PHQ QUESTIONS 1-9: 0

## 2024-05-07 NOTE — ACP (ADVANCE CARE PLANNING)
Advance Care Planning   The patient has the following advanced directives on file:  Advance Directives       Power of  Living Will ACP-Advance Directive ACP-Power of     Not on File Not on File Not on File Not on File            The patient has appointed the following active healthcare agents:    Primary Decision Maker: RUDI NEUMANN - Spouse - [phone]    Secondary Decision Maker: KATT JOSHUA - Parent - [phone]    The Patient has the following current code status:    Code Status: Prior    Visit Documentation:  I discussed Advance Care Planning with Catalina Neumann today which included the importance of making their choices for care and treatment in the case of a health event that adversely affects their decision-making abilities. She has not completed the Advance Care Directives. She does not have an active health care agent at this time.  Catalina Neumann was encouraged to complete the declaration forms and provide a signed copy of her medical records.I advised patient we would continue this discussion at future visits.     Rita Moss  5/7/2024

## 2024-05-07 NOTE — PROGRESS NOTES
Catalina Morrison (: 1985) is a 39 y.o. female, here for evaluation of the following chief complaint(s):  3 Month Follow-Up (Pt is here to review sleep study, ENT visit.) and Anxiety (Pt states she's doing well on )       ASSESSMENT/PLAN:  1. Moderate persistent asthma with acute exacerbation  2. Vestibular migraine  3. Anxiety  -     busPIRone (BUSPAR) 5 MG tablet; Take 1 tablet by mouth 3 times daily as needed (anxiety) Take 1 tablet by mouth in the morning and 2 tablets in the evening as needed, Disp-90 tablet, R-3Normal  4. Diarrhea of presumed infectious origin  -     metroNIDAZOLE (FLAGYL) 500 MG tablet; Take 1 tablet by mouth 2 times daily for 7 days, Disp-14 tablet, R-0Normal             SUBJECTIVE/OBJECTIVE:  HPI: Patient is a pleasant 39-year-old woman with medical history significant for GI upset, moderate persistent asthma, vestibular migraine, and anxiety presenting for follow-up visit.  Patient was recently seen in office for episode of GI upset with treatment of seen pneumonia at that time.  Continued abdominal pain and discomfort despite previous treatment with lomotil. There are several of her Childrens classmates with similar concern. Offering Flagyl at this time given the length of time patients symptoms have been present. Bowel movements 10-12 per day.   Recent ED visit for chest discomfort related to poorly treated asthma. She will see Pulmonology in the near future.   Anxiety currently being treated with Bupsar 1 in the m and two in the evening.   Patient has completed home sleep testing and may be eligible for home CPAP device though records are currently unavailable.  Will contact [place] for medical records and to approve any assistive device.  She is going to ear nose and throat for evaluation and treatment of TMJ leading to migraine.  Social History     Tobacco Use    Smoking status: Some Days     Types: Cigars    Smokeless tobacco: Never   Vaping Use    Vaping Use: Never used

## 2024-05-09 NOTE — TELEPHONE ENCOUNTER
From: Catalina Morrison  To: Dr. Anton Best  Sent: 5/6/2024 10:57 AM EDT  Subject: Note     Can I get a note for not working Wed 5/1? I forgot to get one for work that night

## 2024-05-13 ENCOUNTER — TELEPHONE (OUTPATIENT)
Dept: INTERNAL MEDICINE CLINIC | Facility: CLINIC | Age: 39
End: 2024-05-13

## 2024-05-13 ENCOUNTER — PROCEDURE VISIT (OUTPATIENT)
Dept: OBGYN CLINIC | Age: 39
End: 2024-05-13
Payer: COMMERCIAL

## 2024-05-13 VITALS — BODY MASS INDEX: 46.13 KG/M2 | SYSTOLIC BLOOD PRESSURE: 130 MMHG | DIASTOLIC BLOOD PRESSURE: 80 MMHG | WEIGHT: 277.2 LBS

## 2024-05-13 DIAGNOSIS — L98.9 SKIN LESION: ICD-10-CM

## 2024-05-13 DIAGNOSIS — Z01.812 PRE-PROCEDURE LAB EXAM: ICD-10-CM

## 2024-05-13 LAB
HCG, PREGNANCY, URINE, POC: NEGATIVE
VALID INTERNAL CONTROL, POC: YES

## 2024-05-13 PROCEDURE — 81025 URINE PREGNANCY TEST: CPT | Performed by: OBSTETRICS & GYNECOLOGY

## 2024-05-13 PROCEDURE — 56821 COLPOSCOPY VULVA W/BIOPSY: CPT | Performed by: OBSTETRICS & GYNECOLOGY

## 2024-05-13 PROCEDURE — 99214 OFFICE O/P EST MOD 30 MIN: CPT | Performed by: OBSTETRICS & GYNECOLOGY

## 2024-05-13 NOTE — TELEPHONE ENCOUNTER
Received Sleep Study results and orders. Orders have been signed and will fax over to [facility] @ [phone]. Ty

## 2024-05-13 NOTE — PROGRESS NOTES
Vulvar biopsy     Name:  Catalina Morrison     Date:  5/13/2024      Patient consented and blood pressure taken .A full lengthy discussion held regarding the details of the abn findings  The  procedure was described in detail including  the pros and cons ,length of procedure,anticipated pain level,activity and habits rec after the procedure and time for discussion,questions,comments and other were allowed . Items such as care,scarring and fu were covered.  Anticipated treatment options were discussed as well.  The pt was for less nervous at the end of the exam        Pap bg  pigmented vulva  stitch  care  fu2w       FINDINGS:                                                                                    JACQUELIN TIMMONS MDThe patient is here for  problems including bmi     HISTORY:      Patient's last menstrual period was 04/17/2024 (approximate).SEE BELOW      Current Outpatient Medications on File Prior to Visit   Medication Sig Dispense Refill    busPIRone (BUSPAR) 5 MG tablet Take 1 tablet by mouth 3 times daily as needed (anxiety) Take 1 tablet by mouth in the morning and 2 tablets in the evening as needed 90 tablet 3    metroNIDAZOLE (FLAGYL) 500 MG tablet Take 1 tablet by mouth 2 times daily for 7 days 14 tablet 0    albuterol sulfate HFA (PROVENTIL;VENTOLIN;PROAIR) 108 (90 Base) MCG/ACT inhaler Inhale 2 puffs into the lungs every 4 hours as needed for Wheezing or Shortness of Breath 6 each 5    butalbital-acetaminophen-caffeine (FIORICET, ESGIC) -40 MG per tablet Take 1 tablet by mouth every 4 hours as needed for Headaches 20 tablet 0    gabapentin (NEURONTIN) 600 MG tablet Take 1 tablet by mouth daily for 30 doses. 30 tablet 2    meclizine (ANTIVERT) 25 MG tablet Take 1 tablet by mouth 4 times daily 120 tablet 3    levocetirizine (XYZAL) 5 MG tablet Take 1 tablet by mouth nightly 30 tablet 3    fluocinolone (DERMA-SMOOTHE) 0.01 % external oil Apply topically. 20 mL 3    fluocinonide (LIDEX) 0.05 %

## 2024-05-15 ENCOUNTER — OFFICE VISIT (OUTPATIENT)
Dept: PULMONOLOGY | Age: 39
End: 2024-05-15
Payer: COMMERCIAL

## 2024-05-15 VITALS
BODY MASS INDEX: 45.32 KG/M2 | SYSTOLIC BLOOD PRESSURE: 110 MMHG | RESPIRATION RATE: 18 BRPM | HEIGHT: 65 IN | OXYGEN SATURATION: 98 % | TEMPERATURE: 97.8 F | DIASTOLIC BLOOD PRESSURE: 80 MMHG | HEART RATE: 93 BPM | WEIGHT: 272 LBS

## 2024-05-15 DIAGNOSIS — J30.89 ENVIRONMENTAL AND SEASONAL ALLERGIES: ICD-10-CM

## 2024-05-15 DIAGNOSIS — J45.30 MILD PERSISTENT ASTHMA WITHOUT COMPLICATION: Primary | ICD-10-CM

## 2024-05-15 DIAGNOSIS — R29.818 SUSPECTED SLEEP APNEA: ICD-10-CM

## 2024-05-15 LAB
EXPIRATORY TIME: NORMAL
FEF 25-75% %PRED-PRE: NORMAL
FEF 25-75% PRED: NORMAL
FEF 25-75-PRE: NORMAL
FEV1 %PRED-PRE: 80 %
FEV1 PRED: 3.16 L
FEV1/FVC %PRED-PRE: NORMAL
FEV1/FVC PRED: NORMAL
FEV1/FVC: 77 %
FEV1: 2.54 L
FVC %PRED-PRE: 86 %
FVC PRED: 3.85 L
FVC: 3.31 L
PEF %PRED-PRE: NORMAL
PEF PRED: NORMAL
PEF-PRE: NORMAL

## 2024-05-15 PROCEDURE — 99204 OFFICE O/P NEW MOD 45 MIN: CPT | Performed by: NURSE PRACTITIONER

## 2024-05-15 RX ORDER — FLUTICASONE FUROATE AND VILANTEROL TRIFENATATE 100; 25 UG/1; UG/1
1 POWDER RESPIRATORY (INHALATION) DAILY
Qty: 1 EACH | Refills: 11 | Status: SHIPPED | OUTPATIENT
Start: 2024-05-15

## 2024-05-15 ASSESSMENT — PULMONARY FUNCTION TESTS
FEV1/FVC: 77
FVC_PERCENT_PREDICTED_PRE: 86
FVC_PREDICTED: 3.85
FEV1_PERCENT_PREDICTED_PRE: 80
FVC: 3.31
FEV1: 2.54
FEV1_PREDICTED: 3.16

## 2024-05-15 NOTE — PROGRESS NOTES
Name:  Catalina Morrison  YOB: 1985   MRN: [id number]      Office Visit: 5/15/2024        ASSESSMENT AND PLAN:  (Medical Decision Making)    Impression: 39 y.o. female with referred to [facility] for evaluation of asthma    1. Mild persistent asthma without complication  --Currently only on albuterol.  Main symptoms are dyspnea on exertion and chest tightness  -- Will try Breo 100 and see if this helps minimize exacerbations, exercise intolerance.  -- Continue albuterol as needed    2. Environmental and seasonal allergies  --Continue Xyzal, but may need to consider changing this or adding back Singulair    3. Suspected sleep apnea  --Recently had a sleep study in March 2024.  Through the lab not associated with [facility], so unable to see these results and her primary care is trying to follow-up on this    Orders Placed This Encounter   Medications    BREO ELLIPTA 100-25 MCG/ACT inhaler     Sig: Inhale 1 puff into the lungs daily     Dispense:  1 each     Refill:  11     No orders of the defined types were placed in this encounter.    Follow-up and Dispositions    Return in about 4 months (around 9/15/2024) for asthma, spirometry, Vangie.       Vangie Amador, APRN - CNP    No specialty comments available.    Collaborating physician is Deon Arias MD    Total time for encounter on day of encounter was 46 minutes.  This time includes chart prep, review of tests/procedures, review of other provider's notes, documentation and counseling patient regarding disease process and medications.   _________________________________________________________________________    HISTORY OF PRESENT ILLNESS:    Ms. Catalina Morrison is a 39 y.o. female who is seen at [facility] today for  Asthma and New Patient  Mr. Morrison  is a pleasant lady here today as a new patient for evaluation of asthma and dyspnea on exertion.  She reports a history of asthma since \"birth\".  She has always only been on

## 2024-05-18 ENCOUNTER — HOSPITAL ENCOUNTER (EMERGENCY)
Age: 39
Discharge: HOME | End: 2024-05-18
Payer: COMMERCIAL

## 2024-05-18 VITALS
BODY MASS INDEX: 44.98 KG/M2 | WEIGHT: 270 LBS | DIASTOLIC BLOOD PRESSURE: 111 MMHG | OXYGEN SATURATION: 99 % | HEART RATE: 81 BPM | HEIGHT: 65 IN | TEMPERATURE: 98 F | SYSTOLIC BLOOD PRESSURE: 153 MMHG | RESPIRATION RATE: 18 BRPM

## 2024-05-18 DIAGNOSIS — N72 CERVICITIS AND ENDOCERVICITIS: Primary | ICD-10-CM

## 2024-05-18 DIAGNOSIS — S31.40XA OPEN WOUND OF GENITAL LABIA, INITIAL ENCOUNTER: ICD-10-CM

## 2024-05-18 LAB
BILIRUB UR QL: NEGATIVE
GLUCOSE UR QL STRIP.AUTO: NEGATIVE MG/DL
HCG UR QL: NEGATIVE
KETONES UR-MCNC: NEGATIVE MG/DL
LEUKOCYTE ESTERASE UR QL STRIP: NEGATIVE
NITRITE UR QL: NEGATIVE
PH UR: 5.5 (ref 5–9)
PROT UR QL: NEGATIVE MG/DL
RBC # UR STRIP: ABNORMAL
SERVICE CMNT-IMP: ABNORMAL
SERVICE CMNT-IMP: NORMAL
SP GR UR: >1.03 (ref 1–1.02)
UROBILINOGEN UR QL: 0.2 EU/DL (ref 0.2–1)
WET PREP GENITAL: NORMAL
WET PREP GENITAL: NORMAL

## 2024-05-18 PROCEDURE — 87491 CHLMYD TRACH DNA AMP PROBE: CPT

## 2024-05-18 PROCEDURE — 87210 SMEAR WET MOUNT SALINE/INK: CPT

## 2024-05-18 PROCEDURE — 99283 EMERGENCY DEPT VISIT LOW MDM: CPT

## 2024-05-18 PROCEDURE — 87591 N.GONORRHOEAE DNA AMP PROB: CPT

## 2024-05-18 PROCEDURE — 81003 URINALYSIS AUTO W/O SCOPE: CPT

## 2024-05-18 PROCEDURE — 81025 URINE PREGNANCY TEST: CPT

## 2024-05-18 RX ORDER — METRONIDAZOLE 500 MG/1
500 TABLET ORAL 2 TIMES DAILY
Qty: 14 TABLET | Refills: 0 | Status: SHIPPED | OUTPATIENT
Start: 2024-05-18 | End: 2024-05-25

## 2024-05-18 RX ORDER — DOXYCYCLINE HYCLATE 100 MG
100 TABLET ORAL 2 TIMES DAILY
Qty: 20 TABLET | Refills: 0 | Status: SHIPPED | OUTPATIENT
Start: 2024-05-18 | End: 2024-05-28

## 2024-05-18 ASSESSMENT — PAIN SCALES - GENERAL
PAINLEVEL_OUTOF10: 8
PAINLEVEL_OUTOF10: 0

## 2024-05-18 ASSESSMENT — PAIN - FUNCTIONAL ASSESSMENT
PAIN_FUNCTIONAL_ASSESSMENT: 0-10
PAIN_FUNCTIONAL_ASSESSMENT: 0-10

## 2024-05-18 NOTE — ED TRIAGE NOTES
Pt to ED ambulatory to triage with c/o wiping and tearing the vaginal sutures from a biopsy at her OBGYNs office on Monday. Pt states she isn't supposed to have the sutures out for another ten days. Pt states some vaginal bleeding after the sutures came out. States \"I stuck gauze and neosporin up there to try to clot it off\" Pt states 8/10 pain.

## 2024-05-18 NOTE — DISCHARGE INSTRUCTIONS
Wash two-three times daily with soap and water, blot dry, apply neosporin and a clean dressing. Watch for redness, swelling, pus, increasing pain, fever and return if any of those symptoms begin. Finish all of the antibiotics. Follow up with GYN for a recheck. Return to the ED if worse.

## 2024-05-18 NOTE — ED NOTES
Patient mobility status  with no difficulty. Provider aware     I have reviewed discharge instructions with the patient.  The patient verbalized understanding.    Patient left ED via Discharge Method: ambulatory to Home with  self .    Opportunity for questions and clarification provided.     Patient given 0 scripts.           Lizette Tilley LPN  05/18/24 9618

## 2024-05-18 NOTE — ED PROVIDER NOTES
Emergency Department Provider Note       PCP: Anton Best DO   Age: 39 y.o.   Sex: female     DISPOSITION Decision To Discharge 05/18/2024 04:05:50 PM       ICD-10-CM    1. Cervicitis and endocervicitis  N72       2. Open wound of genital labia, initial encounter  S31.40XA           Medical Decision Making     This procedure was 5 days ago, no indication due to the chronicity of timing to replace the suture particularly in that area.  There is no active bleeding.  She was instructed on wound care to include gentle washing with soap and water, blotting dry and apply Neosporin.  She can apply gauze for pressure if needed.  She is also on her menses and can use a maxi pad which will also help.  Note for work will be written.  Patient with moderate white cells on her wet prep.  Will cover with doxycycline and metronidazole which should also cover the area where she did knock the stitch out.  No intercourse.  Please follow-up with gynecology for recheck.  Return to the ED if worsening in any way.  She is stable for discharge and ambulatory out of the ED without difficulty.  Note for work written for today.     1 or more acute illnesses that pose a threat to life or bodily function.   Over the counter drug management performed.  Prescription drug management performed.  Shared medical decision making was utilized in creating the patients health plan today.  Considerations: The following items were considered but not ordered: Further evaluation.    I independently ordered and reviewed each unique test.  I reviewed external records: ED visit note from an outside group.   The patients assessment required an independent historian: None.  The reason they were needed is .                History     Patient is here with bleeding and pain from the incision site today after wiping at work PTA.  She had a procedure done by her gynecologist on Monday, 5 days ago.  She is currently on her menstrual cycle. No fever, chest pain,  TABLET    Take 1 tablet by mouth every 4 hours as needed for Headaches    FLUOCINOLONE (DERMA-SMOOTHE) 0.01 % EXTERNAL OIL    Apply topically.    FLUOCINONIDE (LIDEX) 0.05 % CREAM    Apply topically 2 times daily.    GABAPENTIN (NEURONTIN) 600 MG TABLET    Take 1 tablet by mouth daily for 30 doses.    LEVOCETIRIZINE (XYZAL) 5 MG TABLET    Take 1 tablet by mouth nightly    MECLIZINE (ANTIVERT) 25 MG TABLET    Take 1 tablet by mouth 4 times daily    MELOXICAM (MOBIC) 15 MG TABLET    Take 1 tablet by mouth daily        Results for orders placed or performed during the hospital encounter of 05/18/24   Wet prep, genital    Specimen: Vaginal   Result Value Ref Range    Special Requests NO SPECIAL REQUESTS      Wet Prep NO YEAST,TRICHOMONAS OR CLUE CELLS NOTED      Wet Prep MODERATE  WBC'S       POCT Urinalysis no Micro   Result Value Ref Range    Specific Gravity, Urine, POC >1.030 (H) 1.001 - 1.023    pH, Urine, POC 5.5 5.0 - 9.0      Protein, Urine, POC Negative NEG mg/dL    Glucose, UA POC Negative NEG mg/dL    Ketones, Urine, POC Negative NEG mg/dL    Bilirubin, Urine, POC Negative NEG      Blood, UA POC LARGE (A) NEG      URINE UROBILINOGEN POC 0.2 0.2 - 1.0 EU/dL    Nitrite, Urine, POC Negative NEG      Leukocyte Est, UA POC Negative NEG      Performed by: Lilian Weller    POC Pregnancy Urine Qual   Result Value Ref Range    Preg Test, Ur Negative NEG           No orders to display                No results for input(s): \"COVID19\" in the last 72 hours.    Voice dictation software was used during the making of this note.  This software is not perfect and grammatical and other typographical errors may be present.  This note has not been completely proofread for errors.     Sheryl Morin PA  05/18/24 1100

## 2024-05-22 LAB
C TRACH RRNA SPEC QL NAA+PROBE: NEGATIVE
N GONORRHOEA RRNA SPEC QL NAA+PROBE: NEGATIVE
SPECIMEN SOURCE: NORMAL

## 2024-05-23 ENCOUNTER — HOSPITAL ENCOUNTER (OUTPATIENT)
Dept: PHYSICAL THERAPY | Age: 39
Setting detail: RECURRING SERIES
Discharge: HOME | End: 2024-05-26
Payer: COMMERCIAL

## 2024-05-23 DIAGNOSIS — G43.809 VESTIBULAR MIGRAINE: Primary | ICD-10-CM

## 2024-05-23 DIAGNOSIS — R26.81 UNSTEADINESS ON FEET: ICD-10-CM

## 2024-05-23 DIAGNOSIS — H83.2X3 VESTIBULAR DISEQUILIBRIUM INVOLVING BOTH INNER EARS: ICD-10-CM

## 2024-05-23 DIAGNOSIS — R26.2 DIFFICULTY IN WALKING, NOT ELSEWHERE CLASSIFIED: ICD-10-CM

## 2024-05-23 PROCEDURE — 97162 PT EVAL MOD COMPLEX 30 MIN: CPT

## 2024-05-23 PROCEDURE — 97530 THERAPEUTIC ACTIVITIES: CPT

## 2024-05-23 ASSESSMENT — PAIN DESCRIPTION - PAIN TYPE: TYPE: CHRONIC PAIN

## 2024-05-23 ASSESSMENT — PAIN DESCRIPTION - LOCATION: LOCATION: HEAD

## 2024-05-23 ASSESSMENT — PAIN SCALES - GENERAL: PAINLEVEL_OUTOF10: 6

## 2024-05-23 NOTE — THERAPY EVALUATION
Pictures/Videos    Fall Risk Scale:   Morel Total Score: 15         OBJECTIVE   MOBILITY:  Range of motion, strength WFL for independent gait, transfers and bed mobility in the clinic during assessment.  Pt walks with slight wide ABE and slight limp on the left from foot surgery last year.  Pt is wearing thick soft soled slides.      ASSESSMENT   Initial Assessment:  39 year old white female here for treatment of imbalance and dizziness.  Long standing history of migraine.  Pt will benefit from skilled physical vestibular therapy to maximize strength, balance and gait ability to decrease risk for falls with possible injury or hospitalization.    Therapy Problem List: (Impacting functional limitations):    Increased Pain, Decreased ROM, Decreased Ambulation Ability/Technique, Decreased Functional Mobility, Decreased Riley with Home Exercise Program, Decreased Balance, Decreased Activity Tolerance/Endurance*, and Vestibular Impairment   Therapy Prognosis:   Good     Initial Assessment Complexity:   Moderate Complexity       PLAN   Effective Dates: 5/23/2024 TO Plan of Care/Certification Expiration Date: 08/21/24     Frequency/Duration: Plan Frequency: 2x per week      Interventions Planned (Treatment may consist of any combination of the following):    Balance Training, Functional Mobility Training, Gait Training, Home Exercise Program (HEP), Manual Therapy, Neuromuscular Re-education/Strengthening, Range of Motion (ROM), Therapeutic Activites, Therapeutic Exercise/Strengthening, and Vestibular Rehab   GOALS: (Goals have been discussed and agreed upon with patient.)  Long Term Functional Goals: Time Frame: 16 visits  Pt will be independent with range of motion, strength, habituation and balance home exercise program.  Pt will increase CTSIB to 120/120 indicating increased balance and decreased risk for falls.  Pt will increase Dynamic Gait Index to 22/24 indicating increase gait balance and decreased risk for

## 2024-05-28 ENCOUNTER — OFFICE VISIT (OUTPATIENT)
Dept: OBGYN CLINIC | Age: 39
End: 2024-05-28
Payer: COMMERCIAL

## 2024-05-28 ENCOUNTER — HOSPITAL ENCOUNTER (OUTPATIENT)
Dept: PHYSICAL THERAPY | Age: 39
Setting detail: RECURRING SERIES
Discharge: HOME | End: 2024-05-31
Payer: COMMERCIAL

## 2024-05-28 VITALS
DIASTOLIC BLOOD PRESSURE: 82 MMHG | HEIGHT: 65 IN | SYSTOLIC BLOOD PRESSURE: 128 MMHG | WEIGHT: 273.6 LBS | BODY MASS INDEX: 45.58 KG/M2

## 2024-05-28 DIAGNOSIS — N76.3 SUBACUTE VULVITIS: Primary | ICD-10-CM

## 2024-05-28 PROCEDURE — 99214 OFFICE O/P EST MOD 30 MIN: CPT | Performed by: OBSTETRICS & GYNECOLOGY

## 2024-05-28 PROCEDURE — 97530 THERAPEUTIC ACTIVITIES: CPT

## 2024-05-28 PROCEDURE — 97110 THERAPEUTIC EXERCISES: CPT

## 2024-05-28 ASSESSMENT — PAIN DESCRIPTION - LOCATION: LOCATION: TOE (COMMENT WHICH ONE)

## 2024-05-28 ASSESSMENT — PAIN SCALES - GENERAL: PAINLEVEL_OUTOF10: 7

## 2024-05-28 NOTE — PROGRESS NOTES
Catalina Morrison  : 1985  Primary: [employer] Smiley (Commercial)  Secondary:  [facility] [place] [address] [address]  Phone: [phone]  Fax: [phone] Plan Frequency: 2x per week    Plan of Care/Certification Expiration Date: 24        Plan of Care/Certification Expiration Date:  Plan of Care/Certification Expiration Date: 24    Frequency/Duration:   Plan Frequency: 2x per week      Time In/Out:   Time In: 0803  Time Out: 0856      PT Visit Info:    Total # of Visits Approved: 99  Progress Note Due Date: 24  Total # of Visits to Date: 2  Progress Note Counter: 2      Visit Count:  2    OUTPATIENT PHYSICAL THERAPY:   Treatment Note 2024       Episode  (vestibular)               Treatment Diagnosis:    Vestibular migraine  Unsteadiness on feet  Vestibular disequilibrium involving both inner ears  Difficulty in walking, not elsewhere classified  Medical/Referring Diagnosis:    Intractable chronic migraine without aura and with status migrainosus    Referring Physician:  Olimpia Jones PA MD Orders:  PT Eval and Treat   Return MD Appt:  unknown   Date of Onset:  No data recorded   Allergies:   Tizanidine  Restrictions/Precautions:   Vestibular deficit    Possible broken left 3rd toe  Interventions Planned (Treatment may consist of any combination of the following):     See Assessment Note    Subjective Comments:   I think I broke a toe - the middle one (3rd toe).  Dropped a piece of steel across my foot.  Busted across 3 toes.  Worked on it now 2 days.  No doc  Left the practice.  Long line - no appts at Urgent care.  Taped it together.  Left foot.  Iced it right after.  Cleaned it as there was some blood.  Took gabapentin today.      Initial Pain Level::   Toe (Comment which one) (left 3rd toe) 7/10;  migraine same.  about a 6/10  Post Session Pain Level:     Toe (Comment which one) (left 3rd toe)  /10  Medications Last Reviewed:

## 2024-05-28 NOTE — PROGRESS NOTES
The patient is here for  problems including vulvitis    HISTORY:      Patient's last menstrual period was 05/17/2024 (approximate).SEE BELOW      Current Outpatient Medications on File Prior to Visit   Medication Sig Dispense Refill    doxycycline hyclate (VIBRA-TABS) 100 MG tablet Take 1 tablet by mouth 2 times daily for 10 days 20 tablet 0    BREO ELLIPTA 100-25 MCG/ACT inhaler Inhale 1 puff into the lungs daily 1 each 11    busPIRone (BUSPAR) 5 MG tablet Take 1 tablet by mouth 3 times daily as needed (anxiety) Take 1 tablet by mouth in the morning and 2 tablets in the evening as needed 90 tablet 3    albuterol sulfate HFA (PROVENTIL;VENTOLIN;PROAIR) 108 (90 Base) MCG/ACT inhaler Inhale 2 puffs into the lungs every 4 hours as needed for Wheezing or Shortness of Breath 6 each 5    butalbital-acetaminophen-caffeine (FIORICET, ESGIC) -40 MG per tablet Take 1 tablet by mouth every 4 hours as needed for Headaches 20 tablet 0    gabapentin (NEURONTIN) 600 MG tablet Take 1 tablet by mouth daily for 30 doses. 30 tablet 2    meclizine (ANTIVERT) 25 MG tablet Take 1 tablet by mouth 4 times daily 120 tablet 3    levocetirizine (XYZAL) 5 MG tablet Take 1 tablet by mouth nightly 30 tablet 3    fluocinolone (DERMA-SMOOTHE) 0.01 % external oil Apply topically. 20 mL 3    fluocinonide (LIDEX) 0.05 % cream Apply topically 2 times daily. 60 g 3    meloxicam (MOBIC) 15 MG tablet Take 1 tablet by mouth daily 30 tablet 3    acetaminophen (TYLENOL) 325 MG tablet Take 1 tablet by mouth every 4 hours as needed       No current facility-administered medications on file prior to visit.   SEE LIST    ROS:      PHYSICAL EXAM:  Blood pressure 128/82, height 1.651 m (5' 5\"), weight 124.1 kg (273 lb 9.6 oz), last menstrual period 05/17/2024.    The patient appears well, alert, oriented x 3, in no distress.  Lungs are clear. Heart RRR, no murmurs. Abdomen soft without tenderness, guarding, mass or organomegaly.  Pelvic: bg

## 2024-05-30 ENCOUNTER — OFFICE VISIT (OUTPATIENT)
Dept: ORTHOPEDIC SURGERY | Age: 39
End: 2024-05-30
Payer: COMMERCIAL

## 2024-05-30 DIAGNOSIS — M79.672 LEFT FOOT PAIN: ICD-10-CM

## 2024-05-30 DIAGNOSIS — M76.822 TIBIALIS TENDINITIS OF LEFT LOWER EXTREMITY: Primary | ICD-10-CM

## 2024-05-30 DIAGNOSIS — S90.122A CONTUSION OF LESSER TOE OF LEFT FOOT WITHOUT DAMAGE TO NAIL, INITIAL ENCOUNTER: ICD-10-CM

## 2024-05-30 PROCEDURE — M5016 MISC CARBON FIBER: HCPCS | Performed by: NURSE PRACTITIONER

## 2024-05-30 PROCEDURE — 99214 OFFICE O/P EST MOD 30 MIN: CPT | Performed by: NURSE PRACTITIONER

## 2024-05-30 NOTE — PROGRESS NOTES
The patient was prescribed and fitted with a carbon fiber insert for the left foot, size large.     Patient read and signed documenting they understand and agree to [facility]'s current DME return policy.

## 2024-05-30 NOTE — PROGRESS NOTES
Name: Catalina Morrison  YOB: 1985  Gender: female  MRN: [id number]    Summary:   Left second and third phalanx contusion, 13 months post flatfoot reconstruction for posterior tibial tendinitis       CC: Follow-up (Left foot pain )       HPI: Catalina Morrison is a 39 y.o. female who presents with Follow-up (Left foot pain )  .  Patient reports about 5 days ago while at her job she sustained an injury to her left foot.  She notes that a steel bar fell on the top of her forefoot.  She has been celina taping the 2 toes as well as icing when she has time.  She is still continuing to work using the current work restrictions.  History was obtained by Patient     ROS/Meds/PSH/PMH/FH/SH: I personally reviewed the patients standard intake form.  Below are the pertinents    Tobacco:  reports that she has been smoking cigars. She has never used smokeless tobacco.  Diabetes: None      Physical Examination:  Upon examination there is noted bruising to the second and third phalanx going proximal to the MTP joints on the dorsal foot.  She is able to move the lesser toes without pain.  There is no pain with palpation to the plantar aspect of the lesser MTP joints.  She has palpable pulses and intact sensation to the foot.  There really is minimal swelling noted to the second and third phalanx today, there is mild swelling to the dorsal forefoot.  There is slight discomfort with range of motion to the second and third phalanx from the MTP joints.      Imaging:   Interpretation of imaging  Left foot XR: AP, Lateral, Oblique views     ICD-10-CM    1. Tibialis tendinitis of left lower extremity  M76.822 XR FOOT LEFT (MIN 3 VIEWS)     Ambulatory Referral to DME      2. Left foot pain  M79.672 XR FOOT LEFT (MIN 3 VIEWS)     Ambulatory Referral to DME         Report: AP, lateral, oblique x-ray of the left foot demonstrates no hardware failures or new acute findings or fractures    Impression: No hardware failures or new

## 2024-06-03 ENCOUNTER — HOSPITAL ENCOUNTER (OUTPATIENT)
Dept: PHYSICAL THERAPY | Age: 39
Setting detail: RECURRING SERIES
Discharge: HOME | End: 2024-06-06
Payer: COMMERCIAL

## 2024-06-03 ENCOUNTER — OFFICE VISIT (OUTPATIENT)
Dept: ORTHOPEDIC SURGERY | Age: 39
End: 2024-06-03
Payer: COMMERCIAL

## 2024-06-03 DIAGNOSIS — M76.822 TIBIALIS TENDINITIS OF LEFT LOWER EXTREMITY: Primary | ICD-10-CM

## 2024-06-03 PROCEDURE — 97530 THERAPEUTIC ACTIVITIES: CPT

## 2024-06-03 PROCEDURE — 99213 OFFICE O/P EST LOW 20 MIN: CPT | Performed by: ORTHOPAEDIC SURGERY

## 2024-06-03 NOTE — PROGRESS NOTES
Catalina Morrison  : 1985  Primary: David Smiley (Commercial)  Secondary:  [facility] [place] [address] [place] [address] [place] [address]  Phone: [phone]  Fax: [phone] Plan Frequency: 2x per week    Plan of Care/Certification Expiration Date: 24        Plan of Care/Certification Expiration Date:  Plan of Care/Certification Expiration Date: 24    Frequency/Duration:   Plan Frequency: 2x per week      Time In/Out:   Time In: 08  Time Out: 846      PT Visit Info:    Total # of Visits Approved: 99  Progress Note Due Date: 24  Total # of Visits to Date: 3  Progress Note Counter: 3      Visit Count:  3    OUTPATIENT PHYSICAL THERAPY:   Treatment Note 6/3/2024       Episode  (vestibular)               Treatment Diagnosis:    Vestibular migraine  Unsteadiness on feet  Vestibular disequilibrium involving both inner ears  Difficulty in walking, not elsewhere classified  Medical/Referring Diagnosis:    Intractable chronic migraine without aura and with status migrainosus    Referring Physician:  Olimpia Jones PA MD Orders:  PT Eval and Treat   Return MD Appt:  unknown   Date of Onset:  No data recorded   Allergies:   Tizanidine  Restrictions/Precautions:   Vestibular deficit    Possible broken left 3rd toe  Interventions Planned (Treatment may consist of any combination of the following):     See Assessment Note    Subjective Comments: It's been a tough week.  With work and my aunt dying.  She (filiberto) said it was probably a stress fracture that they can't see and the bone is bruised.  There is a metal plate in my shoe.  Did the sitting exercises but not really the standing because by the time work is over standing is not an option.  See Dr. Chavez today.      Initial Pain Level::   0   /10 foot;  migraine same.  about a 6/10  Post Session Pain Level:    0    /10 foot  Medications Last Reviewed:  6/3/2024  Updated Objective Findings:     [place] brace and

## 2024-06-03 NOTE — PROGRESS NOTES
Name: Catalina Morrison  YOB: 1985  Gender: female  MRN: [id number]    Summary:     Left second and third toe contusions with history of flatfoot reconstruction     CC: Follow-up (Left second and third phalanx contusion, 13 months post flatfoot reconstruction for posterior tibial tendinitis/ /)       HPI: Catalina Morrison is a 39 y.o. female who presents with Follow-up (Left second and third phalanx contusion, 13 months post flatfoot reconstruction for posterior tibial tendinitis/ /)  .  This patient presents back to the office today with some improvements of her left second and third toe pain after a large object fell on her forefoot on that side.  She been using [place] brace as well as a carbon fiber plate.    History was obtained by Patient     ROS/Meds/PSH/PMH/FH/SH: I personally reviewed the patients standard intake form.  Below are the pertinents    Tobacco:  reports that she has been smoking cigars. She has never used smokeless tobacco.  Diabetes: None      Physical Examination:    Exam of the left lower extremity shows less swelling and tenderness palpation over the second and third metatarsals good clinical alignment.    Imaging:   I independently interpreted XR ordered by a different physician, taken from an outside facility taken of the left foot which showed no evidence of fracture or hardware failure          Assessment:   Left second and third toe contusions with history of flatfoot reconstruction    Treatment Plan:   3 This is stable chronic illness/condition  Treatment at this time: Time with no intervention  Studies ordered: NO XR needed @ Next Visit    Weight-bearing status: WBAT        Return to work/work restrictions: none  No medications given    She is going to try to wean out of her [place] brace and carbon fiber plate and continue with rehab.  She will return in 3 months.

## 2024-06-05 ENCOUNTER — HOSPITAL ENCOUNTER (OUTPATIENT)
Dept: PHYSICAL THERAPY | Age: 39
Setting detail: RECURRING SERIES
Discharge: HOME | End: 2024-06-08
Payer: COMMERCIAL

## 2024-06-05 PROCEDURE — 97530 THERAPEUTIC ACTIVITIES: CPT

## 2024-06-05 NOTE — PROGRESS NOTES
improve mobility, strength, and coordination.  Required minimal visual and verbal cues to promote proper body alignment.  Progressed resistance and complexity of movement as indicated.  THERAPEUTIC ACTIVITY: (24 minutes):    Therapeutic activities per grid below to improve mobility, strength, balance, and coordination.  Required minimal visual and verbal cues to  perfrom correctly and safely .     Date:  5/28/2024 Date:  6/2/2024 Date:  6/5/2024   Activity/Exercise Parameters Parameters    Pt instruction Recommendation to get foot checked per Dr. Chavez office.  Lighter work out today until further clarification per MD. Review of full progression of vestibular exercises. Review of progress to date.  Foot plate and brace helping balance today.  All conditions challenged today were WFL.  Pt to see Dr. Chavez today and see whether to continue with balance therapy for now or wait a few weeks.   Review of Dr. Bazzi finding and suggestions   Heel /toe raises Right foot only - x10 some stretching pain     [place] stretch - curl toes, point foot, uncurl toes, DF foot   X17 started to hurt on the right only (for now).  Prescribe 15     Ankle pumps X20 B     Ankle INV/EV X15 B     Ankle circles 10 each direction B     ABCs One set each foot     Standing EC 3 x 30  First - \"weeblywobbly and tying to avoid balancing with that toe\"  2nd less   3rd used hips more.  Hurt 9/10 when balancing fully both feet.   X30 WFL 3 x 30 seconds mild wobble   No increase in toe pain   Partial tandem EC Left foot front good.  Right foot 3   1st 17  2nd 12  3rd 30 Left foot x30 good  Right foot forward difficult with WS on left.  Improved with ant WS over right foot  3X30' minimal sway with WS forward.     Left foot forward  Right foot forward  X 30 with wobble  2nd regroup right away.  3rd x 30 with wobble  Got a cramp in the right leg    FT head and body turns  No problem    PT head and body turns  No problem    Foam EC   X27 mild left foot

## 2024-06-10 ENCOUNTER — HOSPITAL ENCOUNTER (EMERGENCY)
Age: 39
Discharge: HOME | End: 2024-06-10
Payer: COMMERCIAL

## 2024-06-10 VITALS
TEMPERATURE: 98.1 F | HEIGHT: 65 IN | SYSTOLIC BLOOD PRESSURE: 142 MMHG | RESPIRATION RATE: 16 BRPM | BODY MASS INDEX: 44.98 KG/M2 | WEIGHT: 270 LBS | OXYGEN SATURATION: 99 % | DIASTOLIC BLOOD PRESSURE: 93 MMHG | HEART RATE: 67 BPM

## 2024-06-10 DIAGNOSIS — R51.9 ACUTE NONINTRACTABLE HEADACHE, UNSPECIFIED HEADACHE TYPE: Primary | ICD-10-CM

## 2024-06-10 PROCEDURE — 96361 HYDRATE IV INFUSION ADD-ON: CPT

## 2024-06-10 PROCEDURE — 6370000000 HC RX 637 (ALT 250 FOR IP): Performed by: NURSE PRACTITIONER

## 2024-06-10 PROCEDURE — 99284 EMERGENCY DEPT VISIT MOD MDM: CPT

## 2024-06-10 PROCEDURE — 96374 THER/PROPH/DIAG INJ IV PUSH: CPT

## 2024-06-10 PROCEDURE — 6360000002 HC RX W HCPCS: Performed by: NURSE PRACTITIONER

## 2024-06-10 PROCEDURE — 96375 TX/PRO/DX INJ NEW DRUG ADDON: CPT

## 2024-06-10 PROCEDURE — 2580000003 HC RX 258: Performed by: NURSE PRACTITIONER

## 2024-06-10 PROCEDURE — 96372 THER/PROPH/DIAG INJ SC/IM: CPT

## 2024-06-10 RX ORDER — 0.9 % SODIUM CHLORIDE 0.9 %
1000 INTRAVENOUS SOLUTION INTRAVENOUS
Status: COMPLETED | OUTPATIENT
Start: 2024-06-10 | End: 2024-06-10

## 2024-06-10 RX ORDER — SUMATRIPTAN 6 MG/.5ML
6 INJECTION, SOLUTION SUBCUTANEOUS
Status: COMPLETED | OUTPATIENT
Start: 2024-06-10 | End: 2024-06-10

## 2024-06-10 RX ORDER — PROCHLORPERAZINE EDISYLATE 5 MG/ML
5 INJECTION INTRAMUSCULAR; INTRAVENOUS ONCE
Status: COMPLETED | OUTPATIENT
Start: 2024-06-10 | End: 2024-06-10

## 2024-06-10 RX ORDER — KETOROLAC TROMETHAMINE 15 MG/ML
15 INJECTION, SOLUTION INTRAMUSCULAR; INTRAVENOUS ONCE
Status: COMPLETED | OUTPATIENT
Start: 2024-06-10 | End: 2024-06-10

## 2024-06-10 RX ORDER — DIPHENHYDRAMINE HYDROCHLORIDE 50 MG/ML
12.5 INJECTION INTRAMUSCULAR; INTRAVENOUS EVERY 6 HOURS PRN
Status: DISCONTINUED | OUTPATIENT
Start: 2024-06-10 | End: 2024-06-10 | Stop reason: HOSPADM

## 2024-06-10 RX ADMIN — KETOROLAC TROMETHAMINE 15 MG: 15 INJECTION, SOLUTION INTRAMUSCULAR; INTRAVENOUS at 16:24

## 2024-06-10 RX ADMIN — PROCHLORPERAZINE EDISYLATE 5 MG: 5 INJECTION INTRAMUSCULAR; INTRAVENOUS at 16:24

## 2024-06-10 RX ADMIN — SUMATRIPTAN 6 MG: 6 INJECTION SUBCUTANEOUS at 17:49

## 2024-06-10 RX ADMIN — SODIUM CHLORIDE 1000 ML: 9 INJECTION, SOLUTION INTRAVENOUS at 16:28

## 2024-06-10 RX ADMIN — DIPHENHYDRAMINE HYDROCHLORIDE 12.5 MG: 50 INJECTION INTRAMUSCULAR; INTRAVENOUS at 16:24

## 2024-06-10 ASSESSMENT — LIFESTYLE VARIABLES
HOW OFTEN DO YOU HAVE A DRINK CONTAINING ALCOHOL: MONTHLY OR LESS
HOW MANY STANDARD DRINKS CONTAINING ALCOHOL DO YOU HAVE ON A TYPICAL DAY: 1 OR 2

## 2024-06-10 ASSESSMENT — PAIN - FUNCTIONAL ASSESSMENT: PAIN_FUNCTIONAL_ASSESSMENT: 0-10

## 2024-06-10 ASSESSMENT — PAIN SCALES - GENERAL: PAINLEVEL_OUTOF10: 10

## 2024-06-10 NOTE — ED NOTES
Patient mobility status  with no difficulty. Provider aware     I have reviewed discharge instructions with the spouse.  The patient verbalized understanding.    Patient left ED via Discharge Method: ambulatory to Home with Spouse.    Opportunity for questions and clarification provided.     Patient given 0 scripts.            Nicol Calle LPN  06/10/24 9876

## 2024-06-10 NOTE — ED TRIAGE NOTES
Pt c/o migraines that worsened yesterday. Pt reports sensitivity to light and noise. On migraine medication. Denies vomiting. Reports nausea.

## 2024-06-10 NOTE — ED PROVIDER NOTES
Emergency Department Provider Note       PCP: No primary care provider on file.   Age: 39 y.o.   Sex: female     DISPOSITION Discharge - Pending Orders Complete 06/10/2024 05:47:44 PM       ICD-10-CM    1. Acute nonintractable headache, unspecified headache type  R51.9           Medical Decision Making     Overall well-appearing 39-year-old female presents emergency department today with complaint of headache.  She appears in no acute distress.  No focal neurological deficits noted on exam.  No red flag symptoms.  No meningeal symptoms.  I do not see indication for workup today as current headache is consistent with previous episodes and patient has a history of headaches.  Will give IV meds and fluids at patient's request.    Patient does have some relief after treatment provided.  Encouraged follow-up with her primary care provider.  ER return precautions discussed.     1 acute, uncomplicated illness or injury.  Prescription drug management performed.  Shared medical decision making was utilized in creating the patients health plan today.    I independently ordered and reviewed each unique test.  I reviewed external records: provider visit note from PCP.  I reviewed external records: provider visit note from outside specialist.                   History     39-year-old female presents to the emergency department today with complaint of a headache.  Patient states she has a history of migraines.  Current headache is consistent with previous episodes.  She reports associated nausea, photophobia, and phonophobia.  She took Fioricet without relief.  She denies any vomiting, neck pain, chest pain, shortness of breath, dizziness, vision changes, or fevers.    The history is provided by the patient.     Physical Exam     Vitals signs and nursing note reviewed:  Vitals:    06/10/24 1507   BP: (!) 127/93   Pulse: 93   Resp: 16   Temp: 98.1 °F (36.7 °C)   SpO2: 97%   Weight: 122.5 kg (270 lb)      Physical Exam  Vitals

## 2024-06-10 NOTE — DISCHARGE INSTRUCTIONS
Please follow-up with your primary care provider for recheck of your symptoms.  Continue medications as prescribed by your primary care provider.  Return to the emergency department for any new, worsening, or concerning symptoms.

## 2024-06-11 ENCOUNTER — HOSPITAL ENCOUNTER (OUTPATIENT)
Dept: PHYSICAL THERAPY | Age: 39
Setting detail: RECURRING SERIES
End: 2024-06-11
Payer: COMMERCIAL

## 2024-06-15 ENCOUNTER — APPOINTMENT (OUTPATIENT)
Dept: CT IMAGING | Age: 39
End: 2024-06-15
Payer: COMMERCIAL

## 2024-06-15 ENCOUNTER — HOSPITAL ENCOUNTER (EMERGENCY)
Age: 39
Discharge: HOME | End: 2024-06-15
Payer: COMMERCIAL

## 2024-06-15 VITALS
HEIGHT: 65 IN | DIASTOLIC BLOOD PRESSURE: 98 MMHG | OXYGEN SATURATION: 98 % | BODY MASS INDEX: 44.98 KG/M2 | WEIGHT: 270 LBS | HEART RATE: 66 BPM | SYSTOLIC BLOOD PRESSURE: 128 MMHG | RESPIRATION RATE: 17 BRPM | TEMPERATURE: 97.8 F

## 2024-06-15 DIAGNOSIS — J01.20 ACUTE NON-RECURRENT ETHMOIDAL SINUSITIS: ICD-10-CM

## 2024-06-15 DIAGNOSIS — R51.9 NONINTRACTABLE HEADACHE, UNSPECIFIED CHRONICITY PATTERN, UNSPECIFIED HEADACHE TYPE: Primary | ICD-10-CM

## 2024-06-15 LAB
BILIRUB UR QL: NEGATIVE
GLUCOSE UR QL STRIP.AUTO: NEGATIVE MG/DL
HCG UR QL: NEGATIVE
KETONES UR-MCNC: NEGATIVE MG/DL
LEUKOCYTE ESTERASE UR QL STRIP: NEGATIVE
NITRITE UR QL: NEGATIVE
PH UR: 6 (ref 5–9)
PROT UR QL: NEGATIVE MG/DL
RBC # UR STRIP: NEGATIVE
SERVICE CMNT-IMP: ABNORMAL
SP GR UR: >1.03 (ref 1–1.02)
UROBILINOGEN UR QL: 0.2 EU/DL (ref 0.2–1)

## 2024-06-15 PROCEDURE — 96372 THER/PROPH/DIAG INJ SC/IM: CPT

## 2024-06-15 PROCEDURE — 81003 URINALYSIS AUTO W/O SCOPE: CPT

## 2024-06-15 PROCEDURE — 99284 EMERGENCY DEPT VISIT MOD MDM: CPT

## 2024-06-15 PROCEDURE — 6360000002 HC RX W HCPCS: Performed by: PHYSICIAN ASSISTANT

## 2024-06-15 PROCEDURE — 6370000000 HC RX 637 (ALT 250 FOR IP): Performed by: PHYSICIAN ASSISTANT

## 2024-06-15 PROCEDURE — 70486 CT MAXILLOFACIAL W/O DYE: CPT

## 2024-06-15 PROCEDURE — 81025 URINE PREGNANCY TEST: CPT

## 2024-06-15 RX ORDER — SUMATRIPTAN 6 MG/.5ML
6 INJECTION, SOLUTION SUBCUTANEOUS
Status: COMPLETED | OUTPATIENT
Start: 2024-06-15 | End: 2024-06-15

## 2024-06-15 RX ORDER — RIZATRIPTAN BENZOATE 10 MG/1
10 TABLET ORAL
Qty: 27 TABLET | Refills: 0 | Status: SHIPPED | OUTPATIENT
Start: 2024-06-15 | End: 2024-06-15

## 2024-06-15 RX ORDER — AMOXICILLIN AND CLAVULANATE POTASSIUM 875; 125 MG/1; MG/1
1 TABLET, FILM COATED ORAL 2 TIMES DAILY
Qty: 28 TABLET | Refills: 0 | Status: SHIPPED | OUTPATIENT
Start: 2024-06-15 | End: 2024-06-29

## 2024-06-15 RX ORDER — KETOROLAC TROMETHAMINE 30 MG/ML
60 INJECTION, SOLUTION INTRAMUSCULAR; INTRAVENOUS ONCE
Status: COMPLETED | OUTPATIENT
Start: 2024-06-15 | End: 2024-06-15

## 2024-06-15 RX ORDER — PREDNISONE 10 MG/1
TABLET ORAL
Qty: 45 TABLET | Refills: 0 | Status: SHIPPED | OUTPATIENT
Start: 2024-06-15 | End: 2024-06-29

## 2024-06-15 RX ORDER — DEXAMETHASONE SODIUM PHOSPHATE 10 MG/ML
10 INJECTION INTRAMUSCULAR; INTRAVENOUS
Status: COMPLETED | OUTPATIENT
Start: 2024-06-15 | End: 2024-06-15

## 2024-06-15 RX ADMIN — KETOROLAC TROMETHAMINE 60 MG: 60 INJECTION, SOLUTION INTRAMUSCULAR at 16:22

## 2024-06-15 RX ADMIN — DEXAMETHASONE SODIUM PHOSPHATE 10 MG: 10 INJECTION INTRAMUSCULAR; INTRAVENOUS at 16:22

## 2024-06-15 RX ADMIN — SUMATRIPTAN 6 MG: 6 INJECTION SUBCUTANEOUS at 16:21

## 2024-06-15 ASSESSMENT — PAIN DESCRIPTION - LOCATION
LOCATION: HEAD
LOCATION: HEAD

## 2024-06-15 ASSESSMENT — PAIN - FUNCTIONAL ASSESSMENT: PAIN_FUNCTIONAL_ASSESSMENT: 0-10

## 2024-06-15 ASSESSMENT — PAIN SCALES - GENERAL
PAINLEVEL_OUTOF10: 6
PAINLEVEL_OUTOF10: 9

## 2024-06-15 ASSESSMENT — PAIN DESCRIPTION - ORIENTATION: ORIENTATION: RIGHT

## 2024-06-15 NOTE — ED PROVIDER NOTES
Emergency Department Provider Note       PCP: No primary care provider on file.   Age: 39 y.o.   Sex: female     DISPOSITION Decision To Discharge 06/15/2024 05:58:35 PM       ICD-10-CM    1. Nonintractable headache, unspecified chronicity pattern, unspecified headache type  R51.9       2. Acute non-recurrent ethmoidal sinusitis  J01.20           Medical Decision Making     5:45 PM Called Ritika in [place] regarding report. Scan done at 4:06 PM. She states \"someone is in the study now, they are probably reading it. Give it 5 min and it will be up.\"   5:57 PM the radiologist is reading ethmoid sinusitis.  Will cover with antibiotics and prednisone.  She was instructed to do sinus rinses.  I will also send her with Maxalt with her history of migraines.  She does have an appointment with her primary care physician in July.  She also has an appointment in September with a neurologist.  She is neurologically intact.  CT head 12/01/23 WNL.  Urine does show dehydration today, this can certainly worsen headaches.  She has had no vomiting.  She just finished working at [employer].  She does take Xyzal for her sinuses.  She states she cannot use nasal spray.  She does have an ear nose and throat doctor to follow-up with.  I have done a CT of her sinuses to assess for sinus infection due to the pain of the right frontal area.  I also given a shot of Imitrex, Decadron and Toradol here.  She does have an appointment in July with a new PCP.  She is neurologically intact today.  There are no urgent or emergent signs or symptoms at this time to indicate emergent need for further evaluation here in the ED.     1 or more acute illnesses that pose a threat to life or bodily function.   Over the counter drug management performed.  Prescription drug management performed.  Chronic medical problems impacting care include migraines.  Shared medical decision making was utilized in creating the patients health plan today.  Considerations:  Kamaljit    POC Pregnancy Urine Qual   Result Value Ref Range    Preg Test, Ur Negative NEG           CT SINUS WO CONTRAST   Final Result      1. Mild left-sided ethmoid sinusitis. The remaining paranasal sinuses are clear.      2. Findings suggestive of right inferior turbinate hypertrophy.      Electronically signed by Dakota Vick                   No results for input(s): \"COVID19\" in the last 72 hours.    Voice dictation software was used during the making of this note.  This software is not perfect and grammatical and other typographical errors may be present.  This note has not been completely proofread for errors.     Sheryl Morin PA  06/15/24 5240

## 2024-06-15 NOTE — DISCHARGE INSTRUCTIONS
Drink plenty of fluids, rest, take the medication as directed, follow-up with your primary care physician for recheck and return to the ED if you are worsening in any way.

## 2024-06-15 NOTE — ED NOTES
Patient mobility status  with no difficulty. Provider aware     I have reviewed discharge instructions with the patient.  The patient verbalized understanding.    Patient left ED via Discharge Method: ambulatory to Home with  family .    Opportunity for questions and clarification provided.     Patient given 3 e- scripts.            Mari, Ashleigh, RN  06/15/24 0640

## 2024-06-15 NOTE — ED TRIAGE NOTES
Pt arrives ambulatory to triage with c/o headache that has worsened since Monday. Was evaluated for same on Monday and states the headache eased. States the pain worsened in severity the next days. States she has been taking Fioricet without relief. Endorses light sensitivity and nausea. States she has headache everyday since the age of [age].

## 2024-06-19 ENCOUNTER — HOSPITAL ENCOUNTER (OUTPATIENT)
Dept: PHYSICAL THERAPY | Age: 39
Setting detail: RECURRING SERIES
Discharge: HOME | End: 2024-06-22
Payer: COMMERCIAL

## 2024-06-19 PROCEDURE — 97530 THERAPEUTIC ACTIVITIES: CPT

## 2024-06-19 NOTE — PROGRESS NOTES
Catalina Morrison  : 1985  Primary: [employer] ([employer])  Secondary:  [facility] @ [place] [address] [place]  [facility] [address]  Phone: [phone]  Fax: [phone] Plan Frequency: 2x per week    Plan of Care/Certification Expiration Date: 24        Plan of Care/Certification Expiration Date:  Plan of Care/Certification Expiration Date: 24    Frequency/Duration:   Plan Frequency: 2x per week      Time In/Out:   Time In: 805  Time Out: 845      PT Visit Info:    Total # of Visits Approved: 99  Progress Note Due Date: 24  Total # of Visits to Date: 5  Progress Note Counter: 5      Visit Count:  5    OUTPATIENT PHYSICAL THERAPY:   Treatment Note 2024       Episode  (vestibular)               Treatment Diagnosis:    Vestibular migraine  Unsteadiness on feet  Vestibular disequilibrium involving both inner ears  Difficulty in walking, not elsewhere classified  Medical/Referring Diagnosis:    Intractable chronic migraine without aura and with status migrainosus    Referring Physician:  Olimpia Jones PA MD Orders:  PT Eval and Treat   Return MD Appt:  unknown   Date of Onset:  No data recorded   Allergies:   Tizanidine  Restrictions/Precautions:   Vestibular deficit    Possible broken left 3rd toe  Interventions Planned (Treatment may consist of any combination of the following):     See Assessment Note    Subjective Comments: bruising on arm - first trip to ER migraine really bad.  Stuck me for an IV 2 trips to Er ..  Finally did a CT and bacd sinus infection.  Antibiotics game me new migraine and sterioid.  Messed my balance up.  Almost fell into the safe and so left work.  New version of [employer] - seems to be helping.  Not able to do any balance exercises.  Missed that last appt because I was in the ER.  Monday working in the office and turned around (to the left) and almost fell in.      Initial Pain Level::   0   /10 foot;  migraine same

## 2024-06-26 ENCOUNTER — HOSPITAL ENCOUNTER (OUTPATIENT)
Dept: PHYSICAL THERAPY | Age: 39
Setting detail: RECURRING SERIES
Discharge: HOME | End: 2024-06-29
Payer: COMMERCIAL

## 2024-06-26 PROCEDURE — 97530 THERAPEUTIC ACTIVITIES: CPT

## 2024-06-26 NOTE — PROGRESS NOTES
Catalina Morrison  : 1985  Primary: [employer] ([employer])  Secondary:  [facility] @ [place] [address] [address]  Phone: [phone]  Fax: [phone] Plan Frequency: 2x per week    Plan of Care/Certification Expiration Date: 24        Plan of Care/Certification Expiration Date:  Plan of Care/Certification Expiration Date: 24    Frequency/Duration:   Plan Frequency: 2x per week      Time In/Out:   Time In: 1624  Time Out: 1705      PT Visit Info:    Total # of Visits Approved: 99  Progress Note Due Date: 24  Total # of Visits to Date: 6  Progress Note Counter: 6      Visit Count:  6    OUTPATIENT PHYSICAL THERAPY:   Treatment Note 2024       Episode  (vestibular)               Treatment Diagnosis:    Vestibular migraine  Unsteadiness on feet  Vestibular disequilibrium involving both inner ears  Difficulty in walking, not elsewhere classified  Medical/Referring Diagnosis:    Intractable chronic migraine without aura and with status migrainosus    Referring Physician:  Olimpia Jones PA MD Orders:  PT Eval and Treat   Return MD Appt:  unknown   Date of Onset:  No data recorded   Allergies:   Tizanidine  Restrictions/Precautions:   Vestibular deficit    Possible broken left 3rd toe  Interventions Planned (Treatment may consist of any combination of the following):     See Assessment Note    Subjective Comments: so I went and got some recommendations on shoes for work.  Zepeda.  My other shoes were just not good for long shifts.  Those other ones had gotten broken in and were kind of worn on the side. Feet fleet.  Tried on 3 different kinds of shoes.  Have not been as off balance.  Working long today.  [employer] is busy.  Could do the turns while I was at work.  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  Didn't seem to be too bad.  Did not fall into the safe.  Take Meclizine one time a day.  Could take x 4    Initial

## 2024-08-14 ENCOUNTER — HOSPITAL ENCOUNTER (EMERGENCY)
Age: 39
Discharge: HOME | End: 2024-08-14
Attending: EMERGENCY MEDICINE

## 2024-08-14 ENCOUNTER — APPOINTMENT (OUTPATIENT)
Dept: GENERAL RADIOLOGY | Age: 39
End: 2024-08-14

## 2024-08-14 VITALS
BODY MASS INDEX: 43.32 KG/M2 | SYSTOLIC BLOOD PRESSURE: 124 MMHG | WEIGHT: 260 LBS | HEART RATE: 83 BPM | RESPIRATION RATE: 17 BRPM | HEIGHT: 65 IN | OXYGEN SATURATION: 97 % | DIASTOLIC BLOOD PRESSURE: 77 MMHG | TEMPERATURE: 98.6 F

## 2024-08-14 DIAGNOSIS — S96.912A STRAIN OF LEFT FOOT, INITIAL ENCOUNTER: ICD-10-CM

## 2024-08-14 DIAGNOSIS — S46.912A STRAIN OF LEFT SHOULDER, INITIAL ENCOUNTER: Primary | ICD-10-CM

## 2024-08-14 PROCEDURE — 72040 X-RAY EXAM NECK SPINE 2-3 VW: CPT

## 2024-08-14 PROCEDURE — 73630 X-RAY EXAM OF FOOT: CPT

## 2024-08-14 PROCEDURE — 96372 THER/PROPH/DIAG INJ SC/IM: CPT

## 2024-08-14 PROCEDURE — 6360000002 HC RX W HCPCS: Performed by: EMERGENCY MEDICINE

## 2024-08-14 PROCEDURE — 99284 EMERGENCY DEPT VISIT MOD MDM: CPT

## 2024-08-14 PROCEDURE — 6370000000 HC RX 637 (ALT 250 FOR IP): Performed by: EMERGENCY MEDICINE

## 2024-08-14 RX ORDER — HYDROCODONE BITARTRATE AND ACETAMINOPHEN 5; 325 MG/1; MG/1
1 TABLET ORAL
Status: COMPLETED | OUTPATIENT
Start: 2024-08-14 | End: 2024-08-14

## 2024-08-14 RX ORDER — HYDROCODONE BITARTRATE AND ACETAMINOPHEN 5; 325 MG/1; MG/1
1 TABLET ORAL EVERY 6 HOURS PRN
Qty: 12 TABLET | Refills: 0 | Status: SHIPPED | OUTPATIENT
Start: 2024-08-14 | End: 2024-08-17

## 2024-08-14 RX ORDER — IBUPROFEN 600 MG/1
600 TABLET ORAL 3 TIMES DAILY PRN
Qty: 30 TABLET | Refills: 0 | Status: SHIPPED | OUTPATIENT
Start: 2024-08-14 | End: 2024-08-24

## 2024-08-14 RX ORDER — KETOROLAC TROMETHAMINE 30 MG/ML
30 INJECTION, SOLUTION INTRAMUSCULAR; INTRAVENOUS ONCE
Status: COMPLETED | OUTPATIENT
Start: 2024-08-14 | End: 2024-08-14

## 2024-08-14 RX ORDER — PREDNISONE 50 MG/1
50 TABLET ORAL DAILY
Qty: 5 TABLET | Refills: 0 | Status: SHIPPED | OUTPATIENT
Start: 2024-08-14 | End: 2024-08-19

## 2024-08-14 RX ADMIN — HYDROCODONE BITARTRATE AND ACETAMINOPHEN 1 TABLET: 5; 325 TABLET ORAL at 10:37

## 2024-08-14 RX ADMIN — KETOROLAC TROMETHAMINE 30 MG: 30 INJECTION, SOLUTION INTRAMUSCULAR at 10:38

## 2024-08-14 ASSESSMENT — PAIN - FUNCTIONAL ASSESSMENT: PAIN_FUNCTIONAL_ASSESSMENT: 0-10

## 2024-08-14 ASSESSMENT — PAIN DESCRIPTION - LOCATION
LOCATION: ANKLE;FOOT;SHOULDER
LOCATION: SHOULDER;FOOT

## 2024-08-14 ASSESSMENT — PAIN DESCRIPTION - DESCRIPTORS: DESCRIPTORS: ACHING

## 2024-08-14 ASSESSMENT — PAIN DESCRIPTION - ORIENTATION
ORIENTATION: LEFT
ORIENTATION: LEFT

## 2024-08-14 ASSESSMENT — PAIN SCALES - GENERAL
PAINLEVEL_OUTOF10: 7
PAINLEVEL_OUTOF10: 5

## 2024-08-14 NOTE — ED NOTES
I have reviewed discharge instructions with the patient.  The patient verbalized understanding.    Patient left ED via Discharge Method: ambulatory to Home with family.    Opportunity for questions and clarification provided.       Patient given 0 scripts.         To continue your aftercare when you leave the hospital, you may receive an automated call from our care team to check in on how you are doing.  This is a free service and part of our promise to provide the best care and service to meet your aftercare needs.” If you have questions, or wish to unsubscribe from this service please call [phone].  Thank you for Choosing our [facility] Emergency Department.        Lilian Weller LPN  08/14/24 6523

## 2024-08-14 NOTE — ED PROVIDER NOTES
Sexually Abused: Not asked   Housing Stability: Unknown (2/7/2024)    Housing Stability Vital Sign     Unstable Housing in the Last Year: No         Tizanidine     Previous Medications    ACETAMINOPHEN (TYLENOL) 325 MG TABLET    Take 1 tablet by mouth every 4 hours as needed    ALBUTEROL SULFATE HFA (PROVENTIL;VENTOLIN;PROAIR) 108 (90 BASE) MCG/ACT INHALER    Inhale 2 puffs into the lungs every 4 hours as needed for Wheezing or Shortness of Breath    BREO ELLIPTA 100-25 MCG/ACT INHALER    Inhale 1 puff into the lungs daily    BUSPIRONE (BUSPAR) 5 MG TABLET    Take 1 tablet by mouth 3 times daily as needed (anxiety) Take 1 tablet by mouth in the morning and 2 tablets in the evening as needed    BUTALBITAL-ACETAMINOPHEN-CAFFEINE (FIORICET, ESGIC) -40 MG PER TABLET    Take 1 tablet by mouth every 4 hours as needed for Headaches    FLUOCINOLONE (DERMA-SMOOTHE) 0.01 % EXTERNAL OIL    Apply topically.    FLUOCINONIDE (LIDEX) 0.05 % CREAM    Apply topically 2 times daily.    GABAPENTIN (NEURONTIN) 600 MG TABLET    Take 1 tablet by mouth daily for 30 doses.    LEVOCETIRIZINE (XYZAL) 5 MG TABLET    Take 1 tablet by mouth nightly    MECLIZINE (ANTIVERT) 25 MG TABLET    Take 1 tablet by mouth 4 times daily    MELOXICAM (MOBIC) 15 MG TABLET    Take 1 tablet by mouth daily    RIZATRIPTAN (MAXALT) 10 MG TABLET    Take 1 tablet by mouth once as needed for Migraine May repeat in 2 hours if needed up to 30 mg total in a 24 hour period.        Vitals signs and nursing note reviewed.   Patient Vitals for the past 4 hrs:   Temp Pulse Resp BP SpO2   08/14/24 0911 -- -- 16 -- --   08/14/24 0909 98.6 °F (37 °C) 88 -- (!) 147/111 99 %          Physical Exam  Constitutional:       Appearance: Normal appearance.   Cardiovascular:      Rate and Rhythm: Normal rate and regular rhythm.      Pulses: Normal pulses.   Pulmonary:      Effort: Pulmonary effort is normal.      Breath sounds: Normal breath sounds.   Musculoskeletal:

## 2024-08-14 NOTE — ED TRIAGE NOTES
Pt ambulatory to ED w/ cc of L foot/ankle, L shoulder pain secondary to a stumble on escalator x 1 wk. Pt hx of surgery on L foot. Pt denies head injury, LOC, no blood thinners. Pt A&O x 4

## 2024-11-23 ENCOUNTER — APPOINTMENT (OUTPATIENT)
Dept: GENERAL RADIOLOGY | Age: 39
End: 2024-11-23

## 2024-11-23 ENCOUNTER — HOSPITAL ENCOUNTER (EMERGENCY)
Age: 39
Discharge: HOME | End: 2024-11-23

## 2024-11-23 VITALS
HEIGHT: 65 IN | OXYGEN SATURATION: 97 % | SYSTOLIC BLOOD PRESSURE: 141 MMHG | RESPIRATION RATE: 17 BRPM | HEART RATE: 83 BPM | WEIGHT: 260 LBS | BODY MASS INDEX: 43.32 KG/M2 | TEMPERATURE: 98.4 F | DIASTOLIC BLOOD PRESSURE: 104 MMHG

## 2024-11-23 DIAGNOSIS — R06.02 SHORTNESS OF BREATH: Primary | ICD-10-CM

## 2024-11-23 PROCEDURE — 6370000000 HC RX 637 (ALT 250 FOR IP)

## 2024-11-23 PROCEDURE — 6360000002 HC RX W HCPCS

## 2024-11-23 PROCEDURE — 71046 X-RAY EXAM CHEST 2 VIEWS: CPT

## 2024-11-23 PROCEDURE — 96372 THER/PROPH/DIAG INJ SC/IM: CPT

## 2024-11-23 PROCEDURE — 99284 EMERGENCY DEPT VISIT MOD MDM: CPT

## 2024-11-23 RX ORDER — MAGNESIUM HYDROXIDE/ALUMINUM HYDROXICE/SIMETHICONE 120; 1200; 1200 MG/30ML; MG/30ML; MG/30ML
30 SUSPENSION ORAL
Status: COMPLETED | OUTPATIENT
Start: 2024-11-23 | End: 2024-11-23

## 2024-11-23 RX ORDER — PREDNISONE 10 MG/1
10 TABLET ORAL 2 TIMES DAILY
Qty: 14 TABLET | Refills: 0 | Status: SHIPPED | OUTPATIENT
Start: 2024-11-23 | End: 2024-11-30

## 2024-11-23 RX ORDER — DEXAMETHASONE SODIUM PHOSPHATE 10 MG/ML
8 INJECTION INTRAMUSCULAR; INTRAVENOUS
Status: COMPLETED | OUTPATIENT
Start: 2024-11-23 | End: 2024-11-23

## 2024-11-23 RX ORDER — LIDOCAINE HYDROCHLORIDE 20 MG/ML
15 SOLUTION OROPHARYNGEAL
Status: COMPLETED | OUTPATIENT
Start: 2024-11-23 | End: 2024-11-23

## 2024-11-23 RX ADMIN — DEXAMETHASONE SODIUM PHOSPHATE 8 MG: 10 INJECTION INTRAMUSCULAR; INTRAVENOUS at 18:03

## 2024-11-23 RX ADMIN — ALUMINUM HYDROXIDE, MAGNESIUM HYDROXIDE, AND SIMETHICONE 30 ML: 200; 200; 20 SUSPENSION ORAL at 18:03

## 2024-11-23 RX ADMIN — LIDOCAINE HYDROCHLORIDE 15 ML: 20 SOLUTION ORAL at 18:03

## 2024-11-23 ASSESSMENT — ENCOUNTER SYMPTOMS
GASTROINTESTINAL NEGATIVE: 1
EYES NEGATIVE: 1
SHORTNESS OF BREATH: 1
COUGH: 1
ALLERGIC/IMMUNOLOGIC NEGATIVE: 1

## 2024-11-23 ASSESSMENT — PAIN DESCRIPTION - LOCATION: LOCATION: CHEST;OTHER (COMMENT)

## 2024-11-23 ASSESSMENT — PAIN DESCRIPTION - DESCRIPTORS: DESCRIPTORS: BURNING

## 2024-11-23 ASSESSMENT — PAIN - FUNCTIONAL ASSESSMENT: PAIN_FUNCTIONAL_ASSESSMENT: 0-10

## 2024-11-23 ASSESSMENT — PAIN SCALES - GENERAL: PAINLEVEL_OUTOF10: 7

## 2024-11-23 NOTE — ED TRIAGE NOTES
Pt ambulatory to triage c/o shortness of breath (x several weeks) and epigastric burning (x 2 days). Pt reports two days ago she felt like a piece of food got caught in her throat, she has since passed it but states ever since then she has had epigastric burning and irritation w/ swallowing. Pt endorses nausea. Pt reports using her inhaler w/ no relief. Hx of asthma.

## 2024-11-23 NOTE — ED PROVIDER NOTES
Emergency Department Provider Note       PCP: No primary care provider on file.   Age: 39 y.o.   Sex: female     DISPOSITION Decision To Discharge 11/23/2024 06:42:04 PM    ICD-10-CM    1. Shortness of breath  R06.02           Medical Decision Making     In summary's is a well-appearing nontoxic 39-year-old female arriving to the emergency department for complaints of shortness of breath has been ongoing for the past couple of weeks.  Says she had a cough last week but subsided.  Says she feels short of breath still primarily exertionally.  Has used inhaler with minimal to no relief.  No other URI symptoms reported.  No pain with breathing.  No chest pain.  Breath sounds were normal upon examination.  O2 sat normal.  Pulse normal.  Respiratory rate normal.  No fever.  Chest x-ray did not reveal any acute cardiopulmonary process.  I will start patient on a course of steroids and have her continue to use inhaler as needed.  Have instructed to use humidifier over the next week or 2.  Will have her follow-up with primary care in 1 week to have symptoms rechecked and to receive further recommendations.  This plan was discussed with patient in which she is in agreement with.  Very strict return precautions were discussed in which she verbalizes understanding.  ED Course as of 11/23/24 1851   Sat Nov 23, 2024   1851 Negative chest x-ray [TC]      ED Course User Index  [TC] Micah Landaverde APRN - NP     1 acute, uncomplicated illness or injury.  Prescription drug management performed.  Patient was discharged risks and benefits of hospitalization were considered.  Shared medical decision making was utilized in creating the patients health plan today.  I independently ordered and reviewed each unique test.    I reviewed external records: ED visit note from an outside group.  I reviewed external records: provider visit note from PCP.    history provided patient.  Patient is alert and orient x 4.    I interpreted the X-rays   Take 1 tablet by mouth nightly    MECLIZINE (ANTIVERT) 25 MG TABLET    Take 1 tablet by mouth 4 times daily    MELOXICAM (MOBIC) 15 MG TABLET    Take 1 tablet by mouth daily    RIZATRIPTAN (MAXALT) 10 MG TABLET    Take 1 tablet by mouth once as needed for Migraine May repeat in 2 hours if needed up to 30 mg total in a 24 hour period.        Results from this emergency department visit:      Results for orders placed or performed during the hospital encounter of 11/23/24   XR CHEST (2 VW)    Narrative    Chest X-ray    INDICATION: Shortness of Breath    COMPARISON: 4/27/2024.    TECHNIQUE: PA and lateral views of the chest were obtained.    FINDINGS: The lungs are clear. There are no infiltrates or effusions.  The heart  size is normal.  The bony thorax is intact.        Impression    No acute findings in the chest      Electronically signed by Khris Patino         XR CHEST (2 VW)   Final Result   No acute findings in the chest         Electronically signed by Khris Patino                   No results for input(s): \"COVID19\" in the last 72 hours.     Voice dictation software was used during the making of this note.  This software is not perfect and grammatical and other typographical errors may be present.  This note has not been completely proofread for errors.      Micah Landaverde, APRN - NP  11/23/24 7607

## 2024-11-23 NOTE — DISCHARGE INSTRUCTIONS
As we discussed, your chest x-ray did not reveal any emergent process here.  Your lungs sound good and I do not hear any wheezing.  I will start you on a course of prednisone.  I recommend getting a humidifier to assist with the sensations you feel in the lungs.  I would also recommend using home remedies such as warm teas or honey tea.  If you develop any congestion or cough once more you may take over-the-counter medications for congestion and/or cough.  I placed information down for primary care in which she can follow-up for this as well as other healthcare needs.  As we discussed, please return to the emergency department for new, worsening, concerning symptoms.

## 2024-11-24 NOTE — ED NOTES
Patient mobility status  with no difficulty.     I have reviewed discharge instructions with the patient.  The patient verbalized understanding.    Patient left ED via Discharge Method: ambulatory to Home with Parent.    Opportunity for questions and clarification provided.     Patient given 1 scripts.            Lucrecia Silva RN  11/23/24 0650

## 2025-02-05 ENCOUNTER — HOSPITAL ENCOUNTER (EMERGENCY)
Age: 40
Discharge: HOME | End: 2025-02-05

## 2025-02-05 ENCOUNTER — APPOINTMENT (OUTPATIENT)
Dept: GENERAL RADIOLOGY | Age: 40
End: 2025-02-05

## 2025-02-05 VITALS
RESPIRATION RATE: 16 BRPM | BODY MASS INDEX: 44.15 KG/M2 | HEART RATE: 82 BPM | TEMPERATURE: 98.1 F | DIASTOLIC BLOOD PRESSURE: 91 MMHG | OXYGEN SATURATION: 96 % | HEIGHT: 65 IN | WEIGHT: 265 LBS | SYSTOLIC BLOOD PRESSURE: 118 MMHG

## 2025-02-05 DIAGNOSIS — S60.221A CONTUSION OF RIGHT HAND, INITIAL ENCOUNTER: Primary | ICD-10-CM

## 2025-02-05 PROCEDURE — 73130 X-RAY EXAM OF HAND: CPT

## 2025-02-05 PROCEDURE — 99283 EMERGENCY DEPT VISIT LOW MDM: CPT

## 2025-02-05 ASSESSMENT — PAIN SCALES - GENERAL
PAINLEVEL_OUTOF10: 6
PAINLEVEL_OUTOF10: 7

## 2025-02-05 ASSESSMENT — PAIN DESCRIPTION - LOCATION: LOCATION: HAND

## 2025-02-05 ASSESSMENT — PAIN - FUNCTIONAL ASSESSMENT: PAIN_FUNCTIONAL_ASSESSMENT: 0-10

## 2025-02-05 ASSESSMENT — PAIN DESCRIPTION - ORIENTATION: ORIENTATION: RIGHT

## 2025-02-05 NOTE — ED NOTES
Patient mobility status  with no difficulty.     I have reviewed discharge instructions with the patient.  The patient verbalized understanding.    Patient left ED via Discharge Method: ambulatory to Home with  self .    Opportunity for questions and clarification provided.     Patient given 0 scripts.           Elizabeth Brian LPN  02/05/25 8579

## 2025-02-05 NOTE — DISCHARGE INSTRUCTIONS
The x-ray was reassuring for no acute fracture or dislocation however sometimes it can take a couple of weeks to show up.  Symptomatic treatment is indicated in the meantime.  Rest, ice, elevate, avoid painful activities. ED if worse. Follow up with Ortho for recheck. Use ace wrap for comfort and over-the-counter ibuprofen and/or Tylenol as directed if needed for pain.

## 2025-02-05 NOTE — DISCHARGE INSTR - COC
Continuity of Care Form    Patient Name: Catalina Neumann   :  1985  MRN:  [id number]    Admit date:  2025  Discharge date:  ***    Code Status Order: Prior   Advance Directives:   Advance Care Flowsheet Documentation             Admitting Physician:  No admitting provider for patient encounter.  PCP: No primary care provider on file.    Discharging Nurse: ***  Discharging Hospital Unit/Room#: D11/D11  Discharging Unit Phone Number: ***    Emergency Contact:   Extended Emergency Contact Information  Primary Emergency Contact: RUDI NEUMANN  Address: [address] [place] [id number] [facility]  Home Phone: [phone]  Mobile Phone: [phone]  Relation: Spouse  Secondary Emergency Contact: KATT JOSHUA  Home Phone: [phone]  Mobile Phone: [phone]  Relation: Parent    Past Surgical History:  Past Surgical History:   Procedure Laterality Date    ANKLE SURGERY Left 2023    left spring ligament reconstruction performed by Wisam Chavez III, MD at [facility]    FOOT OSTEOTOMY Left 2023    medial calcaneal slide osteotomy performed by Wisam Chavez III, MD at [facility]    FOOT TENDON SURGERY Left 2023    flexor digitorum longus tendon transfer performed by Wisam Chavez III, MD at [facility]    FRACTURE SURGERY      GASTROCNEMIUS RECESSION Left 2023    gastrocnemius recession performed by Wisam Chavez III, MD at [facility]    GASTROCNEMIUS RECESSION Left 2023    cotton osteotomy performed by Wisam Chavez III, MD at [facility]    HEENT      tonsil--as teen    LITHOTRIPSY Left 2015    OSTEOTOMY Left 2023    posterior tibial tendon reconstruction performed by Wisam Chavez III, MD at [facility]    TONSILLECTOMY         Immunization History:   Immunization History   Administered Date(s) Administered    COVID-19, MODERNA BLUE border, Primary or Immunocompromised, (age 12y+), IM, 100 mcg/0.5mL 2021, 2021    TDaP, ADACEL (age 10y-64y), BOOSTRIX
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
## 2025-02-05 NOTE — ED PROVIDER NOTES
needed for Migraine May repeat in 2 hours if needed up to 30 mg total in a 24 hour period.        Results for orders placed or performed during the hospital encounter of 02/05/25   XR HAND RIGHT (MIN 3 VIEWS)    Narrative    Right hand    INDICATION: Right hand pain after injury    COMPARISON:  None    TECHNIQUE: Three views of the right hand were obtained.    FINDINGS: There is no evidence of fracture or dislocation. There are no bony  erosions. There is no significant joint space narrowing.      Impression    No acute finding.      Electronically signed by Rajinder Rizo         XR HAND RIGHT (MIN 3 VIEWS)   Final Result   No acute finding.         Electronically signed by Rajinder Rizo                   No results for input(s): \"COVID19\" in the last 72 hours.    Voice dictation software was used during the making of this note.  This software is not perfect and grammatical and other typographical errors may be present.  This note has not been completely proofread for errors.     Sheryl Morin PA  02/05/25 6568

## 2025-03-05 ENCOUNTER — HOSPITAL ENCOUNTER (EMERGENCY)
Age: 40
Discharge: HOME | End: 2025-03-05

## 2025-03-05 ENCOUNTER — APPOINTMENT (OUTPATIENT)
Dept: GENERAL RADIOLOGY | Age: 40
End: 2025-03-05

## 2025-03-05 VITALS
TEMPERATURE: 98.4 F | DIASTOLIC BLOOD PRESSURE: 104 MMHG | HEIGHT: 65 IN | OXYGEN SATURATION: 99 % | RESPIRATION RATE: 18 BRPM | BODY MASS INDEX: 44.98 KG/M2 | WEIGHT: 270 LBS | HEART RATE: 91 BPM | SYSTOLIC BLOOD PRESSURE: 138 MMHG

## 2025-03-05 DIAGNOSIS — M79.641 PAIN OF RIGHT HAND: Primary | ICD-10-CM

## 2025-03-05 PROCEDURE — 73110 X-RAY EXAM OF WRIST: CPT

## 2025-03-05 PROCEDURE — 99283 EMERGENCY DEPT VISIT LOW MDM: CPT

## 2025-03-05 PROCEDURE — 73130 X-RAY EXAM OF HAND: CPT

## 2025-03-05 RX ORDER — DICLOFENAC SODIUM 75 MG/1
75 TABLET, DELAYED RELEASE ORAL 2 TIMES DAILY
Qty: 14 TABLET | Refills: 0 | Status: SHIPPED | OUTPATIENT
Start: 2025-03-05 | End: 2025-03-12

## 2025-03-05 ASSESSMENT — PAIN SCALES - GENERAL: PAINLEVEL_OUTOF10: 8

## 2025-03-05 ASSESSMENT — PAIN - FUNCTIONAL ASSESSMENT: PAIN_FUNCTIONAL_ASSESSMENT: 0-10

## 2025-03-05 NOTE — ED NOTES
Patient mobility status  with no difficulty.     I have reviewed discharge instructions with the patient.  The patient verbalized understanding.    Patient left ED via Discharge Method: ambulatory to Home with  family .    Opportunity for questions and clarification provided.     Patient given 2 scripts.            Natalee Ryan RN  03/05/25 [id number]
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
## 2025-03-05 NOTE — DISCHARGE INSTRUCTIONS
Instructions:     General Return Precautions: Overall today, I did not find any life-threatening causes for your symptoms. However, this does not mean that something serious could not develop. If you experience any new or worsening symptoms, it is important that you come back for further evaluation. Not returning for re-evaluation could lead to severe complications, permanent impairment, and/or death. If you are experiencing symptoms of a heart attack, which include but are not limited to chest pain, pressure, that radiates to the neck, arms, back, shortness of breath especially with normal exertion, burping or heartburn please call 911 and return for evaluation. Always be aware of possible stroke symptoms which can be easily remembered by BE FAST Balance (trouble standing/walking), Eyes (vision changes), Face (one sided facial drooping), Arm (weakness/numbness on one side), Speech (speech alterations), Time (Sooner the better).     PCP: During your visit today, we discussed the importance of having a primary care doctor. We would love to help you get a primary care doctor for follow-up after your emergency department visit.  Please call [phone] between 7AM - 6PM Monday to Friday and a care navigator will be able to assist you with setting up a doctor close to your home.      Thank you for choosing to trust us here at [facility] ED with your health today. It was my pleasure to care for you. Please see above for some basic discharge instructions along with things to watch out for which should prompt a return to the Emergency Department for re-evaluation. Please continue to take care of yourself and we hope you recover quickly.

## 2025-03-05 NOTE — ED PROVIDER NOTES
Emergency Department Provider Note       PCP: No primary care provider on file.   Age: 40 y.o.   Sex: female     DISPOSITION Decision To Discharge 03/05/2025 04:56:11 PM    ICD-10-CM    1. Pain of right hand  M79.641           Medical Decision Making     The patient presented with wrist pain following a car door injury. Given the mechanism of injury and the location of pain, differential diagnoses included scaphoid fracture, distal radius fracture, carpal tunnel syndrome, De Quervain's tenosynovitis, and wrist sprain. To rule out fractures, X-rays of the wrist and hand were performed and interpreted, both of which returned negative results. This imaging data significantly reduced the likelihood of a fracture, allowing us to focus on soft tissue injuries as the primary concern. The decision to manage the patient's pain with diclofenac 75 mg and Voltaren gel was based on the absence of fractures and the need for effective pain relief. These medications were chosen for their anti-inflammatory properties, which are appropriate for managing soft tissue injuries. The patient was discharged with these prescriptions and advised on their use for pain management at home. No additional imaging or specialist consultations were deemed necessary at this time, as the negative X-ray findings and the patient's stable condition supported a conservative management approach. The patient was instructed to follow up with their primary care provider if symptoms persist or worsen, ensuring continuity of care and further evaluation if needed.      1 acute, uncomplicated illness or injury.  Prescription drug management performed.  Shared medical decision making was utilized in creating the patients health plan today.  I independently ordered and reviewed each unique test.           I interpreted the X-rays negative fracture dislocation subluxation.              History     The patient is a year-old who presented to the Emergency

## 2025-04-04 ENCOUNTER — HOSPITAL ENCOUNTER (EMERGENCY)
Age: 40
Discharge: HOME | End: 2025-04-04

## 2025-04-04 VITALS
RESPIRATION RATE: 17 BRPM | HEART RATE: 89 BPM | BODY MASS INDEX: 44.98 KG/M2 | WEIGHT: 270 LBS | OXYGEN SATURATION: 97 % | TEMPERATURE: 98.1 F | DIASTOLIC BLOOD PRESSURE: 105 MMHG | HEIGHT: 65 IN | SYSTOLIC BLOOD PRESSURE: 130 MMHG

## 2025-04-04 DIAGNOSIS — G43.909 MIGRAINE WITHOUT STATUS MIGRAINOSUS, NOT INTRACTABLE, UNSPECIFIED MIGRAINE TYPE: Primary | ICD-10-CM

## 2025-04-04 LAB
ALBUMIN SERPL-MCNC: 3.4 G/DL (ref 3.5–5)
ALBUMIN/GLOB SERPL: 0.9 (ref 1–1.9)
ALP SERPL-CCNC: 95 U/L (ref 35–104)
ALT SERPL-CCNC: 11 U/L (ref 8–45)
ANION GAP SERPL CALC-SCNC: 11 MMOL/L (ref 7–16)
AST SERPL-CCNC: 18 U/L (ref 15–37)
BASOPHILS # BLD: 0.05 K/UL (ref 0–0.2)
BASOPHILS NFR BLD: 0.6 % (ref 0–2)
BILIRUB SERPL-MCNC: 0.3 MG/DL (ref 0–1.2)
BUN SERPL-MCNC: 12 MG/DL (ref 6–23)
CALCIUM SERPL-MCNC: 8.9 MG/DL (ref 8.8–10.2)
CHLORIDE SERPL-SCNC: 107 MMOL/L (ref 98–107)
CO2 SERPL-SCNC: 21 MMOL/L (ref 20–29)
CREAT SERPL-MCNC: 0.68 MG/DL (ref 0.6–1.1)
DIFFERENTIAL METHOD BLD: ABNORMAL
EOSINOPHIL # BLD: 0.49 K/UL (ref 0–0.8)
EOSINOPHIL NFR BLD: 6.3 % (ref 0.5–7.8)
ERYTHROCYTE [DISTWIDTH] IN BLOOD BY AUTOMATED COUNT: 12.9 % (ref 11.9–14.6)
GLOBULIN SER CALC-MCNC: 3.8 G/DL (ref 2.3–3.5)
GLUCOSE SERPL-MCNC: 101 MG/DL (ref 70–99)
HCG SERPL QL: NEGATIVE
HCT VFR BLD AUTO: 38.8 % (ref 35.8–46.3)
HGB BLD-MCNC: 12.7 G/DL (ref 11.7–15.4)
IMM GRANULOCYTES # BLD AUTO: 0.02 K/UL (ref 0–0.5)
IMM GRANULOCYTES NFR BLD AUTO: 0.3 % (ref 0–5)
LYMPHOCYTES # BLD: 1.91 K/UL (ref 0.5–4.6)
LYMPHOCYTES NFR BLD: 24.6 % (ref 13–44)
MAGNESIUM SERPL-MCNC: 2.1 MG/DL (ref 1.8–2.4)
MCH RBC QN AUTO: 29 PG (ref 26.1–32.9)
MCHC RBC AUTO-ENTMCNC: 32.7 G/DL (ref 31.4–35)
MCV RBC AUTO: 88.6 FL (ref 82–102)
MONOCYTES # BLD: 0.65 K/UL (ref 0.1–1.3)
MONOCYTES NFR BLD: 8.4 % (ref 4–12)
NEUTS SEG # BLD: 4.63 K/UL (ref 1.7–8.2)
NEUTS SEG NFR BLD: 59.8 % (ref 43–78)
NRBC # BLD: 0 K/UL (ref 0–0.2)
PLATELET # BLD AUTO: 375 K/UL (ref 150–450)
PMV BLD AUTO: 9.2 FL (ref 9.4–12.3)
POTASSIUM SERPL-SCNC: 4.2 MMOL/L (ref 3.5–5.1)
PROT SERPL-MCNC: 7.2 G/DL (ref 6.3–8.2)
RBC # BLD AUTO: 4.38 M/UL (ref 4.05–5.2)
SODIUM SERPL-SCNC: 138 MMOL/L (ref 136–145)
WBC # BLD AUTO: 7.8 K/UL (ref 4.3–11.1)

## 2025-04-04 PROCEDURE — 99284 EMERGENCY DEPT VISIT MOD MDM: CPT

## 2025-04-04 PROCEDURE — 2580000003 HC RX 258: Performed by: PHYSICIAN ASSISTANT

## 2025-04-04 PROCEDURE — 96366 THER/PROPH/DIAG IV INF ADDON: CPT

## 2025-04-04 PROCEDURE — 96365 THER/PROPH/DIAG IV INF INIT: CPT

## 2025-04-04 PROCEDURE — 84703 CHORIONIC GONADOTROPIN ASSAY: CPT

## 2025-04-04 PROCEDURE — 96375 TX/PRO/DX INJ NEW DRUG ADDON: CPT

## 2025-04-04 PROCEDURE — 36415 COLL VENOUS BLD VENIPUNCTURE: CPT

## 2025-04-04 PROCEDURE — 6360000002 HC RX W HCPCS: Performed by: PHYSICIAN ASSISTANT

## 2025-04-04 PROCEDURE — 83735 ASSAY OF MAGNESIUM: CPT

## 2025-04-04 PROCEDURE — 85025 COMPLETE CBC W/AUTO DIFF WBC: CPT

## 2025-04-04 PROCEDURE — 80053 COMPREHEN METABOLIC PANEL: CPT

## 2025-04-04 RX ORDER — DEXAMETHASONE SODIUM PHOSPHATE 10 MG/ML
10 INJECTION, SOLUTION INTRA-ARTICULAR; INTRALESIONAL; INTRAMUSCULAR; INTRAVENOUS; SOFT TISSUE
Status: COMPLETED | OUTPATIENT
Start: 2025-04-04 | End: 2025-04-04

## 2025-04-04 RX ORDER — DIPHENHYDRAMINE HYDROCHLORIDE 50 MG/ML
25 INJECTION, SOLUTION INTRAMUSCULAR; INTRAVENOUS
Status: COMPLETED | OUTPATIENT
Start: 2025-04-04 | End: 2025-04-04

## 2025-04-04 RX ORDER — MAGNESIUM SULFATE 1 G/100ML
1000 INJECTION INTRAVENOUS
Status: COMPLETED | OUTPATIENT
Start: 2025-04-04 | End: 2025-04-04

## 2025-04-04 RX ORDER — METOCLOPRAMIDE HYDROCHLORIDE 5 MG/ML
10 INJECTION INTRAMUSCULAR; INTRAVENOUS
Status: COMPLETED | OUTPATIENT
Start: 2025-04-04 | End: 2025-04-04

## 2025-04-04 RX ORDER — 0.9 % SODIUM CHLORIDE 0.9 %
1000 INTRAVENOUS SOLUTION INTRAVENOUS ONCE
Status: COMPLETED | OUTPATIENT
Start: 2025-04-04 | End: 2025-04-04

## 2025-04-04 RX ADMIN — DEXAMETHASONE SODIUM PHOSPHATE 10 MG: 10 INJECTION INTRAMUSCULAR; INTRAVENOUS at 11:08

## 2025-04-04 RX ADMIN — METOCLOPRAMIDE 10 MG: 5 INJECTION, SOLUTION INTRAMUSCULAR; INTRAVENOUS at 11:08

## 2025-04-04 RX ADMIN — SODIUM CHLORIDE 1000 ML: 0.9 INJECTION, SOLUTION INTRAVENOUS at 11:07

## 2025-04-04 RX ADMIN — MAGNESIUM SULFATE HEPTAHYDRATE 1000 MG: 1 INJECTION, SOLUTION INTRAVENOUS at 11:18

## 2025-04-04 RX ADMIN — DIPHENHYDRAMINE HYDROCHLORIDE 25 MG: 50 INJECTION INTRAMUSCULAR; INTRAVENOUS at 11:07

## 2025-04-04 ASSESSMENT — PAIN DESCRIPTION - PAIN TYPE: TYPE: ACUTE PAIN

## 2025-04-04 ASSESSMENT — PAIN - FUNCTIONAL ASSESSMENT
PAIN_FUNCTIONAL_ASSESSMENT: 0-10
PAIN_FUNCTIONAL_ASSESSMENT: 0-10

## 2025-04-04 ASSESSMENT — PAIN SCALES - GENERAL
PAINLEVEL_OUTOF10: 10
PAINLEVEL_OUTOF10: 3

## 2025-04-04 ASSESSMENT — PAIN DESCRIPTION - LOCATION: LOCATION: HEAD

## 2025-04-04 ASSESSMENT — PAIN DESCRIPTION - DESCRIPTORS: DESCRIPTORS: STABBING

## 2025-04-04 NOTE — ED PROVIDER NOTES
Emergency Department Provider Note       PCP: No primary care provider on file.   Age: 40 y.o.   Sex: female     DISPOSITION Decision To Discharge 04/04/2025 11:52:35 AM   DISPOSITION CONDITION Stable            ICD-10-CM    1. Migraine without status migrainosus, not intractable, unspecified migraine type  G43.909           Medical Decision Making     Patient presents to the ER with complaint of migraine headache.  Patient reports a history of migraine headaches that are usually daily.  States she woke up with 1 this morning which is similar to headache she has had in the past.  Patient reports nausea but no vomiting.  Denies vision changes.  Reports photophobia and phonophobia.  Discussed migraine cocktail with patient and .  They verbalized understanding and agree with plan.    1149 patient is sleeping comfortably on the bed.  Patient awakened and states she is feeling better.  Discussed headache triggers, suggestions to help with headaches, follow-up.  Patient states currently she does not have insurance or a doctor.  Discussed placing a phone number she can call to help with finding a doctor.  Offered a prescription for nausea medication but patient declines at this time.  Patient verbalized understanding and agrees with plan.    Reviewed recent hospital encounters.     1 acute complicated illness or injury.  Shared medical decision making was utilized in creating the patients health plan today.  I independently ordered and reviewed each unique test.                         History     40-year-old female with a history of migraine headaches presents to the ER with complaint of migraine.  Patient states she woke up with a migraine this morning.  It is located on the left side of her head and also the back of her head.  States it is similar to migraine she has had in the past.  States the last time she had 1 like this was about a year ago.  States she takes Advil daily for her headaches.  States she  Lymphocytes % 24.6 13.0 - 44.0 %    Monocytes % 8.4 4.0 - 12.0 %    Eosinophils % 6.3 0.5 - 7.8 %    Basophils % 0.6 0.0 - 2.0 %    Immature Granulocytes % 0.3 0.0 - 5.0 %    Neutrophils Absolute 4.63 1.70 - 8.20 K/UL    Lymphocytes Absolute 1.91 0.50 - 4.60 K/UL    Monocytes Absolute 0.65 0.10 - 1.30 K/UL    Eosinophils Absolute 0.49 0.00 - 0.80 K/UL    Basophils Absolute 0.05 0.00 - 0.20 K/UL    Immature Granulocytes Absolute 0.02 0.0 - 0.5 K/UL   CMP   Result Value Ref Range    Sodium 138 136 - 145 mmol/L    Potassium 4.2 3.5 - 5.1 mmol/L    Chloride 107 98 - 107 mmol/L    CO2 21 20 - 29 mmol/L    Anion Gap 11 7 - 16 mmol/L    Glucose 101 (H) 70 - 99 mg/dL    BUN 12 6 - 23 MG/DL    Creatinine 0.68 0.60 - 1.10 MG/DL    Est, Glom Filt Rate >90 >60 ml/min/1.73m2    Calcium 8.9 8.8 - 10.2 MG/DL    Total Bilirubin 0.3 0.0 - 1.2 MG/DL    ALT 11 8 - 45 U/L    AST 18 15 - 37 U/L    Alk Phosphatase 95 35 - 104 U/L    Total Protein 7.2 6.3 - 8.2 g/dL    Albumin 3.4 (L) 3.5 - 5.0 g/dL    Globulin 3.8 (H) 2.3 - 3.5 g/dL    Albumin/Globulin Ratio 0.9 (L) 1.0 - 1.9     Magnesium   Result Value Ref Range    Magnesium 2.1 1.8 - 2.4 mg/dL   HCG Qualitative, Serum   Result Value Ref Range    Preg, Serum Negative NEG           No orders to display                No results for input(s): \"COVID19\" in the last 72 hours.     Voice dictation software was used during the making of this note.  This software is not perfect and grammatical and other typographical errors may be present.  This note has not been completely proofread for errors.     Aracely Saenz PA-C  04/04/25 1558

## 2025-04-04 NOTE — ED TRIAGE NOTES
Patient arrived with a complaint of migraine- history of daily migraines. Patient took 3 tylenol before coming here.

## 2025-04-04 NOTE — DISCHARGE INSTRUCTIONS
Drink at least 8 to 10 cups of water a day.    Avoid migraine triggers: Chocolate, cheese, caffeine or any other food or beverage that seems to trigger the headache.    Please follow up with a doctor in 2-3 days.    Return to ER for worsening symptoms, high fever, chest pain, shortness of breath, vomiting or vomiting blood, abdominal pain or any other concerning symptoms.    Call [phone] between 7 AM and 6 PM Monday to Friday.  A care navigator can help you set up an appointment with a primary care physician close to your home.

## 2025-06-09 ENCOUNTER — APPOINTMENT (OUTPATIENT)
Dept: GENERAL RADIOLOGY | Age: 40
End: 2025-06-09

## 2025-06-09 ENCOUNTER — HOSPITAL ENCOUNTER (EMERGENCY)
Age: 40
Discharge: HOME | End: 2025-06-09
Attending: EMERGENCY MEDICINE

## 2025-06-09 VITALS
TEMPERATURE: 98.1 F | HEART RATE: 86 BPM | SYSTOLIC BLOOD PRESSURE: 125 MMHG | WEIGHT: 275 LBS | DIASTOLIC BLOOD PRESSURE: 81 MMHG | BODY MASS INDEX: 45.82 KG/M2 | OXYGEN SATURATION: 97 % | RESPIRATION RATE: 28 BRPM | HEIGHT: 65 IN

## 2025-06-09 DIAGNOSIS — G89.29 CHRONIC LEFT SHOULDER PAIN: ICD-10-CM

## 2025-06-09 DIAGNOSIS — G43.709 CHRONIC MIGRAINE WITHOUT AURA WITHOUT STATUS MIGRAINOSUS, NOT INTRACTABLE: ICD-10-CM

## 2025-06-09 DIAGNOSIS — M25.512 CHRONIC LEFT SHOULDER PAIN: ICD-10-CM

## 2025-06-09 DIAGNOSIS — S46.912A STRAIN OF LEFT SHOULDER, INITIAL ENCOUNTER: Primary | ICD-10-CM

## 2025-06-09 LAB
ALBUMIN SERPL-MCNC: 3.5 G/DL (ref 3.5–5)
ALBUMIN/GLOB SERPL: 0.9 (ref 1–1.9)
ALP SERPL-CCNC: 113 U/L (ref 35–104)
ALT SERPL-CCNC: 16 U/L (ref 8–45)
ANION GAP SERPL CALC-SCNC: 13 MMOL/L (ref 7–16)
AST SERPL-CCNC: 15 U/L (ref 15–37)
BILIRUB SERPL-MCNC: 0.3 MG/DL (ref 0–1.2)
BUN SERPL-MCNC: 11 MG/DL (ref 6–23)
CALCIUM SERPL-MCNC: 9.1 MG/DL (ref 8.8–10.2)
CHLORIDE SERPL-SCNC: 106 MMOL/L (ref 98–107)
CO2 SERPL-SCNC: 22 MMOL/L (ref 20–29)
CREAT SERPL-MCNC: 0.89 MG/DL (ref 0.6–1.1)
GLOBULIN SER CALC-MCNC: 4 G/DL (ref 2.3–3.5)
GLUCOSE SERPL-MCNC: 103 MG/DL (ref 70–99)
LIPASE SERPL-CCNC: 23 U/L (ref 13–60)
POTASSIUM SERPL-SCNC: 4.1 MMOL/L (ref 3.5–5.1)
PROT SERPL-MCNC: 7.6 G/DL (ref 6.3–8.2)
SODIUM SERPL-SCNC: 141 MMOL/L (ref 136–145)

## 2025-06-09 PROCEDURE — 96375 TX/PRO/DX INJ NEW DRUG ADDON: CPT

## 2025-06-09 PROCEDURE — 96374 THER/PROPH/DIAG INJ IV PUSH: CPT

## 2025-06-09 PROCEDURE — 6370000000 HC RX 637 (ALT 250 FOR IP): Performed by: EMERGENCY MEDICINE

## 2025-06-09 PROCEDURE — 73030 X-RAY EXAM OF SHOULDER: CPT

## 2025-06-09 PROCEDURE — 99284 EMERGENCY DEPT VISIT MOD MDM: CPT

## 2025-06-09 PROCEDURE — 83690 ASSAY OF LIPASE: CPT

## 2025-06-09 PROCEDURE — 6360000002 HC RX W HCPCS: Performed by: EMERGENCY MEDICINE

## 2025-06-09 PROCEDURE — 80053 COMPREHEN METABOLIC PANEL: CPT

## 2025-06-09 RX ORDER — DIPHENHYDRAMINE HYDROCHLORIDE 50 MG/ML
25 INJECTION, SOLUTION INTRAMUSCULAR; INTRAVENOUS
Status: COMPLETED | OUTPATIENT
Start: 2025-06-09 | End: 2025-06-09

## 2025-06-09 RX ORDER — KETOROLAC TROMETHAMINE 10 MG/1
10 TABLET, FILM COATED ORAL EVERY 6 HOURS PRN
Qty: 20 TABLET | Refills: 0 | Status: SHIPPED | OUTPATIENT
Start: 2025-06-09 | End: 2025-06-14

## 2025-06-09 RX ORDER — PREDNISONE 20 MG/1
40 TABLET ORAL DAILY
Qty: 6 TABLET | Refills: 0 | Status: SHIPPED | OUTPATIENT
Start: 2025-06-10 | End: 2025-06-13

## 2025-06-09 RX ORDER — DEXAMETHASONE SODIUM PHOSPHATE 10 MG/ML
10 INJECTION, SOLUTION INTRA-ARTICULAR; INTRALESIONAL; INTRAMUSCULAR; INTRAVENOUS; SOFT TISSUE ONCE
Status: COMPLETED | OUTPATIENT
Start: 2025-06-09 | End: 2025-06-09

## 2025-06-09 RX ORDER — PROCHLORPERAZINE EDISYLATE 5 MG/ML
10 INJECTION INTRAMUSCULAR; INTRAVENOUS ONCE
Status: COMPLETED | OUTPATIENT
Start: 2025-06-09 | End: 2025-06-09

## 2025-06-09 RX ORDER — LIDOCAINE 4 G/G
2 PATCH TOPICAL ONCE
Status: DISCONTINUED | OUTPATIENT
Start: 2025-06-09 | End: 2025-06-09 | Stop reason: HOSPADM

## 2025-06-09 RX ORDER — DIAZEPAM 5 MG/1
TABLET ORAL
Qty: 10 TABLET | Refills: 0 | Status: SHIPPED | OUTPATIENT
Start: 2025-06-09 | End: 2025-06-17

## 2025-06-09 RX ORDER — KETOROLAC TROMETHAMINE 15 MG/ML
15 INJECTION, SOLUTION INTRAMUSCULAR; INTRAVENOUS ONCE
Status: COMPLETED | OUTPATIENT
Start: 2025-06-09 | End: 2025-06-09

## 2025-06-09 RX ADMIN — KETOROLAC TROMETHAMINE 15 MG: 15 INJECTION, SOLUTION INTRAMUSCULAR; INTRAVENOUS at 20:46

## 2025-06-09 RX ADMIN — PROCHLORPERAZINE EDISYLATE 10 MG: 5 INJECTION INTRAMUSCULAR; INTRAVENOUS at 20:44

## 2025-06-09 RX ADMIN — DIPHENHYDRAMINE HYDROCHLORIDE 25 MG: 50 INJECTION INTRAMUSCULAR; INTRAVENOUS at 20:47

## 2025-06-09 RX ADMIN — DEXAMETHASONE SODIUM PHOSPHATE 10 MG: 10 INJECTION INTRAMUSCULAR; INTRAVENOUS at 20:42

## 2025-06-09 ASSESSMENT — PAIN DESCRIPTION - LOCATION
LOCATION: SHOULDER
LOCATION: SHOULDER

## 2025-06-09 ASSESSMENT — PAIN - FUNCTIONAL ASSESSMENT: PAIN_FUNCTIONAL_ASSESSMENT: 0-10

## 2025-06-09 ASSESSMENT — PAIN SCALES - GENERAL
PAINLEVEL_OUTOF10: 8
PAINLEVEL_OUTOF10: 8
PAINLEVEL_OUTOF10: 5

## 2025-06-09 ASSESSMENT — PAIN DESCRIPTION - ORIENTATION
ORIENTATION: LEFT
ORIENTATION: LEFT

## 2025-06-09 ASSESSMENT — PAIN DESCRIPTION - DESCRIPTORS: DESCRIPTORS: ACHING

## 2025-06-09 NOTE — ED PROVIDER NOTES
Alk Phosphatase 113 (H) 35 - 104 U/L    Total Protein 7.6 6.3 - 8.2 g/dL    Albumin 3.5 3.5 - 5.0 g/dL    Globulin 4.0 (H) 2.3 - 3.5 g/dL    Albumin/Globulin Ratio 0.9 (L) 1.0 - 1.9     Lipase   Result Value Ref Range    Lipase 23 13 - 60 U/L      XR SHOULDER LEFT (MIN 2 VIEWS)   Final Result   Negative left shoulder         Electronically signed by Lon Jones                      Voice dictation software was used during the making of this note.  This software is not perfect and grammatical and other typographical errors may be present.  This note has not been completely proofread for errors.     Fabricio Fox MD  06/10/25 0034

## 2025-06-09 NOTE — ED TRIAGE NOTES
Pt reports she has a known shoulder injury from 2022 that reports \"its permanent\". Pt reports that she sneezed last night and reports acute pain and unable to use L arm since.

## 2025-06-10 NOTE — DISCHARGE INSTRUCTIONS
Return as needed for any questions concerns or worsening symptoms particular increasing/unremitting shoulder pain, inability to feel or move your fingers/hand, discoloration or lack of temperature (cold/pale arm).  As discussed we recommend over-the-counter Tylenol for 6 hours for the next 7 to 10 days, use of 4% Lidoderm numbing patches which can be purchased over-the-counter at any major pharmacy, asked the pharmacist to help you locate these 4% Lidoderm numbing patches.  You may use these 12 hours on/12 hours off.  We do recommend follow-up with a primary care provider or discussion with orthopedics requesting physical therapy referral.    Please return for any questions, concerns or worsening symptoms, particularly weakness of the arms or legs, changes in behavior, repeated falls, increasing/unremitting dizziness, recurrent vomiting, difficulty speaking, increasing/unremitting headache.

## (undated) DEVICE — KIT INSTR W/ 2.4MM GUIDEPIN SUT PASS WIRE NO2 FIBERWIRE

## (undated) DEVICE — GLOVE SURG SZ 65 L12IN FNGR THK79MIL GRN LTX FREE

## (undated) DEVICE — BNDG,ELSTC,MATRIX,STRL,3"X5YD,LF,HOOK&LP: Brand: MEDLINE

## (undated) DEVICE — SUTURE MCRYL SZ 3-0 L27IN ABSRB UD L24MM PS-1 3/8 CIR PRIM Y936H

## (undated) DEVICE — FOOT & ANKLE SOFT DR WOMACK: Brand: MEDLINE INDUSTRIES, INC.

## (undated) DEVICE — PADDING CAST W2INXL4YD ST COT COHESIVE HND TEARABLE SPEC

## (undated) DEVICE — DRAPE C ARM W54XL84IN MINI FOR OEC 6800

## (undated) DEVICE — SPLINT THMB W4XL30IN FBRGLS PD PRECUT LTWT DURABLE FAST SET

## (undated) DEVICE — ZIMMER® STERILE DISPOSABLE TOURNIQUET CUFF WITH PLC, DUAL PORT, SINGLE BLADDER, 18 IN. (46 CM)

## (undated) DEVICE — BANDAGE COMPR L5YDXW2IN FOAM CO FLX

## (undated) DEVICE — SUTURE VCRL SZ 2-0 L27IN ABSRB UD L26MM CT-2 1/2 CIR J269H

## (undated) DEVICE — BANDAGE,ELASTIC,ESMARK,STERILE,4"X9',LF: Brand: MEDLINE

## (undated) DEVICE — GLOVE SURG SZ 65 CRM LTX FREE POLYISOPRENE POLYMER BEAD ANTI

## (undated) DEVICE — GOWN,SIRUS,NONRNF,SETINSLV,XL,20/CS: Brand: MEDLINE

## (undated) DEVICE — SUTURE NONABSORBABLE MONOFILAMENT 3-0 PS-1 18 IN BLK ETHILON 1663H

## (undated) DEVICE — DRESSING PETRO W3XL8IN OIL EMUL N ADH GZ KNIT IMPREG CELOS

## (undated) DEVICE — BANDAGE,GAUZE,CONFORMING,2"X75",STRL,LF: Brand: MEDLINE INDUSTRIES, INC.

## (undated) DEVICE — SUTURE FIBERWIRE SZ 2 W/ TAPERED NEEDLE BLUE L38IN NONABSORB BLU L26.5MM 1/2 CIRCLE AR7200

## (undated) DEVICE — BANDAGE COBAN 6 IN WND 6INX5YD FOAM

## (undated) DEVICE — FOOT DR TOLLISON & WOMACK: Brand: MEDLINE INDUSTRIES, INC.

## (undated) DEVICE — SOLUTION IRRIG 1000ML 0.9% SOD CHL USP POUR PLAS BTL

## (undated) DEVICE — GLOVE SURG SZ 8 L12IN FNGR THK79MIL GRN LTX FREE

## (undated) DEVICE — GUIDEWIRE ORTH L12IN DIA2.4MM NTHRD TRCR TIP DISP FOR 5TH